# Patient Record
Sex: FEMALE | Race: WHITE | NOT HISPANIC OR LATINO
[De-identification: names, ages, dates, MRNs, and addresses within clinical notes are randomized per-mention and may not be internally consistent; named-entity substitution may affect disease eponyms.]

---

## 2018-06-21 ENCOUNTER — RX RENEWAL (OUTPATIENT)
Age: 78
End: 2018-06-21

## 2018-06-28 ENCOUNTER — RX RENEWAL (OUTPATIENT)
Age: 78
End: 2018-06-28

## 2018-07-25 ENCOUNTER — RECORD ABSTRACTING (OUTPATIENT)
Age: 78
End: 2018-07-25

## 2018-07-25 ENCOUNTER — RX RENEWAL (OUTPATIENT)
Age: 78
End: 2018-07-25

## 2018-07-26 ENCOUNTER — RX RENEWAL (OUTPATIENT)
Age: 78
End: 2018-07-26

## 2018-08-01 ENCOUNTER — APPOINTMENT (OUTPATIENT)
Dept: HEART AND VASCULAR | Facility: CLINIC | Age: 78
End: 2018-08-01
Payer: MEDICARE

## 2018-08-01 VITALS
BODY MASS INDEX: 20.38 KG/M2 | HEIGHT: 63 IN | RESPIRATION RATE: 12 BRPM | SYSTOLIC BLOOD PRESSURE: 170 MMHG | WEIGHT: 115 LBS | HEART RATE: 60 BPM | DIASTOLIC BLOOD PRESSURE: 90 MMHG

## 2018-08-01 DIAGNOSIS — Z82.3 FAMILY HISTORY OF STROKE: ICD-10-CM

## 2018-08-01 DIAGNOSIS — Z78.9 OTHER SPECIFIED HEALTH STATUS: ICD-10-CM

## 2018-08-01 DIAGNOSIS — Z83.3 FAMILY HISTORY OF DIABETES MELLITUS: ICD-10-CM

## 2018-08-01 PROCEDURE — 99214 OFFICE O/P EST MOD 30 MIN: CPT

## 2018-08-01 RX ORDER — AZITHROMYCIN 250 MG/1
250 TABLET, FILM COATED ORAL
Qty: 6 | Refills: 0 | Status: DISCONTINUED | COMMUNITY
Start: 2018-02-28

## 2018-08-01 RX ORDER — RANITIDINE 150 MG/1
150 TABLET ORAL
Qty: 20 | Refills: 0 | Status: DISCONTINUED | COMMUNITY
Start: 2018-04-02

## 2018-08-01 RX ORDER — OLOPATADINE HYDROCHLORIDE 7 MG/ML
0.7 SOLUTION OPHTHALMIC
Qty: 2 | Refills: 0 | Status: ACTIVE | COMMUNITY
Start: 2018-04-02

## 2018-08-01 RX ORDER — DULOXETINE HYDROCHLORIDE 30 MG/1
30 CAPSULE, DELAYED RELEASE PELLETS ORAL
Qty: 7 | Refills: 0 | Status: DISCONTINUED | COMMUNITY
Start: 2018-02-28

## 2018-08-01 RX ORDER — MELOXICAM 7.5 MG/1
7.5 TABLET ORAL
Qty: 30 | Refills: 0 | Status: DISCONTINUED | COMMUNITY
Start: 2018-02-16

## 2018-08-01 RX ORDER — FLUTICASONE PROPIONATE 50 UG/1
50 SPRAY, METERED NASAL
Qty: 16 | Refills: 0 | Status: DISCONTINUED | COMMUNITY
Start: 2018-02-14

## 2018-08-01 RX ORDER — DULOXETINE HYDROCHLORIDE 60 MG/1
60 CAPSULE, DELAYED RELEASE PELLETS ORAL
Qty: 30 | Refills: 0 | Status: DISCONTINUED | COMMUNITY
Start: 2018-02-28

## 2018-08-01 RX ORDER — METHYLPREDNISOLONE 4 MG/1
4 TABLET ORAL
Qty: 120 | Refills: 0 | Status: DISCONTINUED | COMMUNITY
Start: 2018-02-02

## 2018-08-01 RX ORDER — SULFAMETHOXAZOLE AND TRIMETHOPRIM 800; 160 MG/1; MG/1
800-160 TABLET ORAL
Qty: 20 | Refills: 0 | Status: DISCONTINUED | COMMUNITY
Start: 2018-03-08

## 2018-08-01 RX ORDER — PREDNISONE 20 MG/1
20 TABLET ORAL
Qty: 20 | Refills: 0 | Status: DISCONTINUED | COMMUNITY
Start: 2018-02-28

## 2018-08-01 RX ORDER — REPAGLINIDE 0.5 MG/1
0.5 TABLET ORAL
Qty: 90 | Refills: 0 | Status: DISCONTINUED | COMMUNITY
Start: 2018-06-26

## 2018-08-01 RX ORDER — CIPROFLOXACIN HYDROCHLORIDE 500 MG/1
500 TABLET, FILM COATED ORAL
Qty: 20 | Refills: 0 | Status: DISCONTINUED | COMMUNITY
Start: 2018-03-08

## 2018-08-01 RX ORDER — OLOPATADINE HYDROCHLORIDE 2 MG/ML
0.2 SOLUTION OPHTHALMIC
Qty: 2 | Refills: 0 | Status: ACTIVE | COMMUNITY
Start: 2018-06-06

## 2018-08-01 RX ORDER — GABAPENTIN 300 MG/1
300 CAPSULE ORAL
Qty: 90 | Refills: 0 | Status: DISCONTINUED | COMMUNITY
Start: 2018-05-08

## 2018-08-01 RX ORDER — ONDANSETRON 4 MG/1
4 TABLET ORAL
Qty: 30 | Refills: 0 | Status: DISCONTINUED | COMMUNITY
Start: 2018-03-02

## 2018-08-01 RX ORDER — METHYLPREDNISOLONE 4 MG/1
4 TABLET ORAL
Qty: 21 | Refills: 0 | Status: DISCONTINUED | COMMUNITY
Start: 2018-04-10

## 2018-08-01 NOTE — PHYSICAL EXAM
[No Acute Distress] : no acute distress [Well Nourished] : well nourished [Well Developed] : well developed [Normal Outer Ear/Nose] : the outer ears and nose were normal in appearance [Normal Oropharynx] : the oropharynx was normal [No JVD] : no jugular venous distention [Supple] : supple [No Lymphadenopathy] : no lymphadenopathy [No Respiratory Distress] : no respiratory distress  [Clear to Auscultation] : lungs were clear to auscultation bilaterally [No Accessory Muscle Use] : no accessory muscle use [Normal Rate] : normal rate  [Regular Rhythm] : with a regular rhythm [Normal S1, S2] : normal S1 and S2 [No Edema] : there was no peripheral edema [Soft] : abdomen soft [Non Tender] : non-tender [No HSM] : no HSM [Normal Bowel Sounds] : normal bowel sounds [No Joint Swelling] : no joint swelling [Grossly Normal Strength/Tone] : grossly normal strength/tone [Normal Gait] : normal gait [Coordination Grossly Intact] : coordination grossly intact [No Focal Deficits] : no focal deficits [de-identified] : rt CVAS tenderness

## 2018-08-01 NOTE — PLAN
[FreeTextEntry1] : 1. Back pain: Patient with right costovertebral angle pain on palpation, reports foul-smelling urine but denies dysuria. Will get a urinalysis/urine culture and advise. Patient denies any fever, chills, nausea vomiting.patient scheduled for fasting blood work tomorrow by [employer], advised to get copy for review\par 2. hypertension: Patient reports eating potato chips prior to her visit here today and reports only taking the Lotrel but not the carvedilol and is planning on taking the carvedilol when she leaves she reports that she checks her blood pressure routinely and typically numbers are between 120-140/80-90. Shortly advised against salt rich diet\par 3. Left wrist pain: Advised to see orthopedics.

## 2018-08-01 NOTE — HISTORY OF PRESENT ILLNESS
[___ Days ago] : [unfilled] days ago [Episodic] : episodic [Moderate] : moderate [At Night] : at night [Worsening] : worsening [de-identified] : l/s spine  [FreeTextEntry8] : 77-year-old female comes in complaining of bilateral lower back pain worse at night worse with taking deep breaths difficulty laying on the right side due to pain. Patient follows closely with a pain specialist and is scheduled to see him. Patient denies any fever, trauma, hematuria.

## 2018-08-02 LAB
APPEARANCE: CLEAR
BACTERIA: NEGATIVE
BILIRUBIN URINE: NEGATIVE
BLOOD URINE: NEGATIVE
COLOR: YELLOW
GLUCOSE QUALITATIVE U: NEGATIVE MG/DL
HYALINE CASTS: 2 /LPF
KETONES URINE: NEGATIVE
LEUKOCYTE ESTERASE URINE: NEGATIVE
MICROSCOPIC-UA: NORMAL
NITRITE URINE: NEGATIVE
PH URINE: 6.5
PROTEIN URINE: NEGATIVE MG/DL
RED BLOOD CELLS URINE: 1 /HPF
SPECIFIC GRAVITY URINE: 1.01
SQUAMOUS EPITHELIAL CELLS: 1 /HPF
UROBILINOGEN URINE: NEGATIVE MG/DL
WHITE BLOOD CELLS URINE: 1 /HPF

## 2018-08-07 LAB — BACTERIA UR CULT: NORMAL

## 2018-08-28 ENCOUNTER — RX RENEWAL (OUTPATIENT)
Age: 78
End: 2018-08-28

## 2018-09-06 ENCOUNTER — RX RENEWAL (OUTPATIENT)
Age: 78
End: 2018-09-06

## 2018-09-26 ENCOUNTER — APPOINTMENT (OUTPATIENT)
Dept: HEART AND VASCULAR | Facility: CLINIC | Age: 78
End: 2018-09-26
Payer: MEDICARE

## 2018-09-26 VITALS
WEIGHT: 113 LBS | BODY MASS INDEX: 20.02 KG/M2 | SYSTOLIC BLOOD PRESSURE: 150 MMHG | RESPIRATION RATE: 12 BRPM | DIASTOLIC BLOOD PRESSURE: 90 MMHG | HEIGHT: 63 IN | HEART RATE: 70 BPM

## 2018-09-26 VITALS — WEIGHT: 118 LBS | HEIGHT: 63 IN | BODY MASS INDEX: 20.91 KG/M2

## 2018-09-26 DIAGNOSIS — Z87.09 PERSONAL HISTORY OF OTHER DISEASES OF THE RESPIRATORY SYSTEM: ICD-10-CM

## 2018-09-26 DIAGNOSIS — Z87.19 PERSONAL HISTORY OF OTHER DISEASES OF THE DIGESTIVE SYSTEM: ICD-10-CM

## 2018-09-26 LAB — S PYO AG SPEC QL IA: NORMAL

## 2018-09-26 PROCEDURE — 87880 STREP A ASSAY W/OPTIC: CPT | Mod: QW

## 2018-09-26 PROCEDURE — 99213 OFFICE O/P EST LOW 20 MIN: CPT

## 2018-09-26 RX ORDER — METRONIDAZOLE 500 MG/1
500 TABLET ORAL
Qty: 28 | Refills: 0 | Status: COMPLETED | COMMUNITY
Start: 2018-09-12

## 2018-09-26 RX ORDER — CEFPODOXIME PROXETIL 200 MG/1
200 TABLET, FILM COATED ORAL
Qty: 20 | Refills: 0 | Status: COMPLETED | COMMUNITY
Start: 2018-09-12

## 2018-09-26 NOTE — HISTORY OF PRESENT ILLNESS
[FreeTextEntry8] : 77-year-old female comes in with complaints of URI, sore throat runny nose headaches pain. Patient reports recent hospitalization for diverticulitis has just completed a course of 2 antibiotics including ciprofloxacin.

## 2018-09-26 NOTE — REVIEW OF SYSTEMS
[Fatigue] : fatigue [Nasal Discharge] : nasal discharge [Sore Throat] : sore throat [Negative] : Psychiatric

## 2018-09-26 NOTE — PHYSICAL EXAM
[No Acute Distress] : no acute distress [Well Nourished] : well nourished [Well Developed] : well developed [EOMI] : extraocular movements intact [Normal Outer Ear/Nose] : the outer ears and nose were normal in appearance [No JVD] : no jugular venous distention [Supple] : supple [No Respiratory Distress] : no respiratory distress  [Clear to Auscultation] : lungs were clear to auscultation bilaterally [No Accessory Muscle Use] : no accessory muscle use [Normal Rate] : normal rate  [Regular Rhythm] : with a regular rhythm [Normal S1, S2] : normal S1 and S2 [Soft] : abdomen soft [Non Tender] : non-tender [No HSM] : no HSM [Normal Bowel Sounds] : normal bowel sounds [No Joint Swelling] : no joint swelling [Grossly Normal Strength/Tone] : grossly normal strength/tone [Normal Gait] : normal gait [de-identified] : erythematous no exudate

## 2018-09-28 ENCOUNTER — RX RENEWAL (OUTPATIENT)
Age: 78
End: 2018-09-28

## 2018-10-03 LAB — BACTERIA THROAT CULT: NORMAL

## 2018-10-25 ENCOUNTER — APPOINTMENT (OUTPATIENT)
Dept: HEART AND VASCULAR | Facility: CLINIC | Age: 78
End: 2018-10-25

## 2018-10-30 ENCOUNTER — RX RENEWAL (OUTPATIENT)
Age: 78
End: 2018-10-30

## 2018-12-03 ENCOUNTER — RX RENEWAL (OUTPATIENT)
Age: 78
End: 2018-12-03

## 2018-12-31 ENCOUNTER — RX RENEWAL (OUTPATIENT)
Age: 78
End: 2018-12-31

## 2019-01-28 ENCOUNTER — APPOINTMENT (OUTPATIENT)
Dept: HEART AND VASCULAR | Facility: CLINIC | Age: 79
End: 2019-01-28
Payer: MEDICARE

## 2019-01-28 VITALS — DIASTOLIC BLOOD PRESSURE: 70 MMHG | SYSTOLIC BLOOD PRESSURE: 140 MMHG

## 2019-01-28 VITALS — HEIGHT: 63 IN | WEIGHT: 119 LBS | BODY MASS INDEX: 21.09 KG/M2

## 2019-01-28 PROCEDURE — 99213 OFFICE O/P EST LOW 20 MIN: CPT

## 2019-01-28 NOTE — HISTORY OF PRESENT ILLNESS
[FreeTextEntry8] : dysuria\par The patient presents with 3-4 days of burning pain while urinating with no fever or no loss of appetite and no diarrhea or vomiting.

## 2019-01-30 LAB
APPEARANCE: CLEAR
BACTERIA: NEGATIVE
BILIRUBIN URINE: NEGATIVE
BLOOD URINE: NEGATIVE
COLOR: YELLOW
GLUCOSE QUALITATIVE U: NEGATIVE MG/DL
HYALINE CASTS: 0 /LPF
KETONES URINE: NEGATIVE
LEUKOCYTE ESTERASE URINE: ABNORMAL
MICROSCOPIC-UA: NORMAL
NITRITE URINE: NEGATIVE
PH URINE: 6
PROTEIN URINE: NEGATIVE MG/DL
RED BLOOD CELLS URINE: 2 /HPF
SPECIFIC GRAVITY URINE: 1.01
SQUAMOUS EPITHELIAL CELLS: 1 /HPF
UROBILINOGEN URINE: NEGATIVE MG/DL
WHITE BLOOD CELLS URINE: 64 /HPF

## 2019-01-31 LAB — BACTERIA UR CULT: ABNORMAL

## 2019-02-25 ENCOUNTER — APPOINTMENT (OUTPATIENT)
Dept: HEART AND VASCULAR | Facility: CLINIC | Age: 79
End: 2019-02-25
Payer: MEDICARE

## 2019-02-25 VITALS
SYSTOLIC BLOOD PRESSURE: 170 MMHG | BODY MASS INDEX: 21.62 KG/M2 | RESPIRATION RATE: 12 BRPM | WEIGHT: 122 LBS | HEART RATE: 74 BPM | DIASTOLIC BLOOD PRESSURE: 80 MMHG | HEIGHT: 63 IN

## 2019-02-25 PROCEDURE — 99213 OFFICE O/P EST LOW 20 MIN: CPT

## 2019-02-25 RX ORDER — CIPROFLOXACIN HYDROCHLORIDE 500 MG/1
500 TABLET, FILM COATED ORAL TWICE DAILY
Qty: 14 | Refills: 0 | Status: DISCONTINUED | COMMUNITY
Start: 2019-01-28 | End: 2019-02-25

## 2019-02-25 NOTE — PLAN
[FreeTextEntry1] : 1. Sinusitis: Will prescribe the Zithromax as patient is pen allergic advice to take with food and to call or return of symptoms do not improve or worsen. advised plenty of warm fluids\par 2. Hypertension: Patient does not feel well will will reassess at the next appointment.

## 2019-02-25 NOTE — HISTORY OF PRESENT ILLNESS
[FreeTextEntry8] : 78-year-old female comes in for evaluation of sinusitis. Patient reports 4 days of pressure in her head behind her eyes runny nose productive cough and sore throat no fever.

## 2019-02-25 NOTE — REVIEW OF SYSTEMS
[Cough] : cough [Negative] : Neurological [FreeTextEntry2] : see history of present illness [FreeTextEntry4] : see history of present illness

## 2019-02-28 ENCOUNTER — RX RENEWAL (OUTPATIENT)
Age: 79
End: 2019-02-28

## 2019-04-09 ENCOUNTER — RX RENEWAL (OUTPATIENT)
Age: 79
End: 2019-04-09

## 2019-04-09 ENCOUNTER — APPOINTMENT (OUTPATIENT)
Dept: HEART AND VASCULAR | Facility: CLINIC | Age: 79
End: 2019-04-09
Payer: MEDICARE

## 2019-04-09 VITALS — HEIGHT: 63 IN | WEIGHT: 119 LBS | BODY MASS INDEX: 21.09 KG/M2

## 2019-04-09 VITALS — SYSTOLIC BLOOD PRESSURE: 130 MMHG | DIASTOLIC BLOOD PRESSURE: 70 MMHG

## 2019-04-09 PROCEDURE — 99214 OFFICE O/P EST MOD 30 MIN: CPT

## 2019-04-09 RX ORDER — AZITHROMYCIN 250 MG/1
250 TABLET, FILM COATED ORAL
Qty: 1 | Refills: 0 | Status: DISCONTINUED | COMMUNITY
Start: 2019-02-25 | End: 2019-04-09

## 2019-04-09 NOTE — PHYSICAL EXAM
[No Acute Distress] : no acute distress [Normal Sclera/Conjunctiva] : normal sclera/conjunctiva [No Lymphadenopathy] : no lymphadenopathy [No Respiratory Distress] : no respiratory distress  [Clear to Auscultation] : lungs were clear to auscultation bilaterally [Normal Rate] : normal rate  [Soft] : abdomen soft [de-identified] : mild coryza

## 2019-04-09 NOTE — HISTORY OF PRESENT ILLNESS
[FreeTextEntry8] : Is a 78-year-old female who presents with 3-4 days of sore throat cough rhinitis body aches with no fevers.

## 2019-04-09 NOTE — REVIEW OF SYSTEMS
[Chills] : chills [Fatigue] : fatigue [Pain] : pain [Earache] : earache [Nasal Discharge] : nasal discharge [Shortness Of Breath] : shortness of breath

## 2019-04-12 ENCOUNTER — MOBILE ON CALL (OUTPATIENT)
Age: 79
End: 2019-04-12

## 2019-04-12 LAB — BACTERIA THROAT CULT: NORMAL

## 2019-05-02 ENCOUNTER — RX RENEWAL (OUTPATIENT)
Age: 79
End: 2019-05-02

## 2019-05-09 ENCOUNTER — APPOINTMENT (OUTPATIENT)
Dept: HEART AND VASCULAR | Facility: CLINIC | Age: 79
End: 2019-05-09
Payer: MEDICARE

## 2019-05-09 VITALS — SYSTOLIC BLOOD PRESSURE: 130 MMHG | DIASTOLIC BLOOD PRESSURE: 70 MMHG

## 2019-05-09 PROCEDURE — 36415 COLL VENOUS BLD VENIPUNCTURE: CPT

## 2019-05-10 LAB
25(OH)D3 SERPL-MCNC: 29.6 NG/ML
ALBUMIN SERPL ELPH-MCNC: 4.7 G/DL
ALP BLD-CCNC: 59 U/L
ALT SERPL-CCNC: 13 U/L
ANION GAP SERPL CALC-SCNC: 13 MMOL/L
AST SERPL-CCNC: 21 U/L
BASOPHILS # BLD AUTO: 0.02 K/UL
BASOPHILS NFR BLD AUTO: 0.4 %
BILIRUB SERPL-MCNC: 0.6 MG/DL
BUN SERPL-MCNC: 18 MG/DL
CALCIUM SERPL-MCNC: 10.1 MG/DL
CHLORIDE SERPL-SCNC: 102 MMOL/L
CHOLEST SERPL-MCNC: 224 MG/DL
CHOLEST/HDLC SERPL: 2.6 RATIO
CO2 SERPL-SCNC: 27 MMOL/L
CREAT SERPL-MCNC: 0.89 MG/DL
EOSINOPHIL # BLD AUTO: 0.1 K/UL
EOSINOPHIL NFR BLD AUTO: 2 %
ESTIMATED AVERAGE GLUCOSE: 151 MG/DL
GLUCOSE SERPL-MCNC: 136 MG/DL
HBA1C MFR BLD HPLC: 6.9 %
HCT VFR BLD CALC: 44.7 %
HDLC SERPL-MCNC: 87 MG/DL
HGB BLD-MCNC: 13.4 G/DL
IMM GRANULOCYTES NFR BLD AUTO: 0.2 %
LDLC SERPL CALC-MCNC: 120 MG/DL
LYMPHOCYTES # BLD AUTO: 2.78 K/UL
LYMPHOCYTES NFR BLD AUTO: 55.9 %
MAN DIFF?: NORMAL
MCHC RBC-ENTMCNC: 30 GM/DL
MCHC RBC-ENTMCNC: 30.2 PG
MCV RBC AUTO: 100.7 FL
MONOCYTES # BLD AUTO: 0.36 K/UL
MONOCYTES NFR BLD AUTO: 7.2 %
NEUTROPHILS # BLD AUTO: 1.7 K/UL
NEUTROPHILS NFR BLD AUTO: 34.3 %
PLATELET # BLD AUTO: 184 K/UL
POTASSIUM SERPL-SCNC: 4.5 MMOL/L
PROT SERPL-MCNC: 6.8 G/DL
RBC # BLD: 4.44 M/UL
RBC # FLD: 13.8 %
SODIUM SERPL-SCNC: 142 MMOL/L
T3 SERPL-MCNC: 96 NG/DL
T3FREE SERPL-MCNC: 2.8 PG/ML
T4 FREE SERPL-MCNC: 1.2 NG/DL
T4 SERPL-MCNC: 6.5 UG/DL
TRIGL SERPL-MCNC: 85 MG/DL
TSH SERPL-ACNC: 2.95 UIU/ML
WBC # FLD AUTO: 4.97 K/UL

## 2019-05-14 ENCOUNTER — APPOINTMENT (OUTPATIENT)
Dept: HEART AND VASCULAR | Facility: CLINIC | Age: 79
End: 2019-05-14
Payer: MEDICARE

## 2019-05-14 VITALS — SYSTOLIC BLOOD PRESSURE: 130 MMHG | DIASTOLIC BLOOD PRESSURE: 70 MMHG

## 2019-05-14 VITALS — HEIGHT: 63 IN | BODY MASS INDEX: 21.26 KG/M2 | WEIGHT: 120 LBS

## 2019-05-14 PROCEDURE — 99214 OFFICE O/P EST MOD 30 MIN: CPT

## 2019-05-14 PROCEDURE — 93306 TTE W/DOPPLER COMPLETE: CPT

## 2019-05-14 PROCEDURE — 93000 ELECTROCARDIOGRAM COMPLETE: CPT

## 2019-05-14 NOTE — ASSESSMENT
[FreeTextEntry1] : MR on echo unchanged\par meds reviewed \par Has hgaic 6.9 will see endo next week \par continue diet control for now \par diet discussed

## 2019-05-14 NOTE — PHYSICAL EXAM
[General Appearance - Well Developed] : well developed [Normal Appearance] : normal appearance [General Appearance - Well Nourished] : well nourished [Well Groomed] : well groomed [No Deformities] : no deformities [Normal Conjunctiva] : the conjunctiva exhibited no abnormalities [General Appearance - In No Acute Distress] : no acute distress [No Oral Pallor] : no oral pallor [Eyelids - No Xanthelasma] : the eyelids demonstrated no xanthelasmas [Normal Oral Mucosa] : normal oral mucosa [Normal Jugular Venous A Waves Present] : normal jugular venous A waves present [No Oral Cyanosis] : no oral cyanosis [Normal Jugular Venous V Waves Present] : normal jugular venous V waves present [No Jugular Venous Gould A Waves] : no jugular venous gould A waves [Exaggerated Use Of Accessory Muscles For Inspiration] : no accessory muscle use [Respiration, Rhythm And Depth] : normal respiratory rhythm and effort [Rhythm Regular] : regular [Auscultation Breath Sounds / Voice Sounds] : lungs were clear to auscultation bilaterally [Normal S1] : normal S1 [Normal S2] : normal S2 [II] : a grade 2 [Abdomen Tenderness] : non-tender [Abdomen Soft] : soft [Abdomen Mass (___ Cm)] : no abdominal mass palpated [Abnormal Walk] : normal gait [Nail Clubbing] : no clubbing of the fingernails [Gait - Sufficient For Exercise Testing] : the gait was sufficient for exercise testing [Cyanosis, Localized] : no localized cyanosis [Skin Color & Pigmentation] : normal skin color and pigmentation [Petechial Hemorrhages (___cm)] : no petechial hemorrhages [No Venous Stasis] : no venous stasis [] : no rash [No Skin Ulcers] : no skin ulcer [Skin Lesions] : no skin lesions [No Xanthoma] : no  xanthoma was observed [Oriented To Time, Place, And Person] : oriented to person, place, and time [Affect] : the affect was normal [Mood] : the mood was normal [No Anxiety] : not feeling anxious [No Acute Distress] : no acute distress [Normal Sclera/Conjunctiva] : normal sclera/conjunctiva [No Lymphadenopathy] : no lymphadenopathy [No Respiratory Distress] : no respiratory distress  [Clear to Auscultation] : lungs were clear to auscultation bilaterally [Soft] : abdomen soft [Normal Rate] : normal rate  [de-identified] : mild coryza

## 2019-05-14 NOTE — REVIEW OF SYSTEMS
[Fatigue] : fatigue [Chills] : chills [Earache] : earache [Nasal Discharge] : nasal discharge [Palpitations] : palpitations [Chest Pain] : chest pain [Shortness Of Breath] : shortness of breath [Back Pain] : back pain [Muscle Pain] : muscle pain [Joint Stiffness] : joint stiffness [Joint Pain] : joint pain [Muscle Cramps] : muscle cramps [Negative] : Constitutional [Pain] : no pain

## 2019-05-14 NOTE — HISTORY OF PRESENT ILLNESS
[FreeTextEntry1] : Patient is a 78-year-old female with baseline hypertension and diffuse myalgias of unknown etiology who presents for routine medical cardiac followup\par Patient has intermittent headaches and dizziness with no symptoms of syncope her effort tolerance is somewhat limited due to diffuse orthopedic limitations.\par followed for AODM OFF MEDS RECENT HGAIC 6.9

## 2019-06-12 ENCOUNTER — RX RENEWAL (OUTPATIENT)
Age: 79
End: 2019-06-12

## 2019-07-08 ENCOUNTER — RX RENEWAL (OUTPATIENT)
Age: 79
End: 2019-07-08

## 2019-07-11 ENCOUNTER — APPOINTMENT (OUTPATIENT)
Dept: HEART AND VASCULAR | Facility: CLINIC | Age: 79
End: 2019-07-11

## 2019-07-15 ENCOUNTER — APPOINTMENT (OUTPATIENT)
Dept: HEART AND VASCULAR | Facility: CLINIC | Age: 79
End: 2019-07-15

## 2019-07-31 ENCOUNTER — RX RENEWAL (OUTPATIENT)
Age: 79
End: 2019-07-31

## 2019-08-01 ENCOUNTER — RX RENEWAL (OUTPATIENT)
Age: 79
End: 2019-08-01

## 2019-08-22 ENCOUNTER — APPOINTMENT (OUTPATIENT)
Dept: HEART AND VASCULAR | Facility: CLINIC | Age: 79
End: 2019-08-22
Payer: MEDICARE

## 2019-08-22 PROCEDURE — 99214 OFFICE O/P EST MOD 30 MIN: CPT

## 2019-08-22 NOTE — ASSESSMENT
[FreeTextEntry1] : \par meds reviewed \par Has hgaic 6.9 will see endo next week \par continue diet control for now \par diet discussed \par no signs of DVT \par tonic water 4oz at bedtime

## 2019-08-22 NOTE — HISTORY OF PRESENT ILLNESS
[FreeTextEntry1] : Patient is a 78-year-old female with baseline hypertension and diffuse myalgias of unknown etiology who presents for routine medical cardiac followup\par Patient has intermittent headaches and dizziness with no symptoms of syncope her effort tolerance is somewhat limited due to diffuse orthopedic limitations.\par followed for AODM OFF MEDS RECENT HGAIC 6.9\par Has longstanding variose veins \par Has had recent nocturnal leg cramping which resolves spontaneously\par mo leg pain or swelling \par

## 2019-08-22 NOTE — PHYSICAL EXAM
[General Appearance - Well Developed] : well developed [Well Groomed] : well groomed [Normal Appearance] : normal appearance [General Appearance - Well Nourished] : well nourished [No Deformities] : no deformities [Normal Conjunctiva] : the conjunctiva exhibited no abnormalities [General Appearance - In No Acute Distress] : no acute distress [Normal Oral Mucosa] : normal oral mucosa [Eyelids - No Xanthelasma] : the eyelids demonstrated no xanthelasmas [No Oral Pallor] : no oral pallor [No Oral Cyanosis] : no oral cyanosis [Normal Jugular Venous A Waves Present] : normal jugular venous A waves present [Normal Jugular Venous V Waves Present] : normal jugular venous V waves present [No Jugular Venous Gould A Waves] : no jugular venous gould A waves [Respiration, Rhythm And Depth] : normal respiratory rhythm and effort [Exaggerated Use Of Accessory Muscles For Inspiration] : no accessory muscle use [Auscultation Breath Sounds / Voice Sounds] : lungs were clear to auscultation bilaterally [Abdomen Soft] : soft [Abdomen Tenderness] : non-tender [Abdomen Mass (___ Cm)] : no abdominal mass palpated [Abnormal Walk] : normal gait [Gait - Sufficient For Exercise Testing] : the gait was sufficient for exercise testing [Nail Clubbing] : no clubbing of the fingernails [Petechial Hemorrhages (___cm)] : no petechial hemorrhages [Cyanosis, Localized] : no localized cyanosis [Skin Color & Pigmentation] : normal skin color and pigmentation [] : no rash [No Venous Stasis] : no venous stasis [Skin Lesions] : no skin lesions [No Skin Ulcers] : no skin ulcer [No Xanthoma] : no  xanthoma was observed [Oriented To Time, Place, And Person] : oriented to person, place, and time [Affect] : the affect was normal [Mood] : the mood was normal [No Anxiety] : not feeling anxious [Normal S1] : normal S1 [Rhythm Regular] : regular [Normal S2] : normal S2 [II] : a grade 2 [No Acute Distress] : no acute distress [Normal Sclera/Conjunctiva] : normal sclera/conjunctiva [No Lymphadenopathy] : no lymphadenopathy [No Respiratory Distress] : no respiratory distress  [Clear to Auscultation] : lungs were clear to auscultation bilaterally [Normal Rate] : normal rate  [Soft] : abdomen soft [de-identified] : mild coryza

## 2019-08-22 NOTE — REVIEW OF SYSTEMS
[Muscle Cramps] : muscle cramps [Chills] : chills [Fatigue] : fatigue [Earache] : earache [Chest Pain] : chest pain [Nasal Discharge] : nasal discharge [Palpitations] : palpitations [Shortness Of Breath] : shortness of breath [Joint Stiffness] : joint stiffness [Joint Pain] : joint pain [Back Pain] : back pain [Muscle Pain] : muscle pain [Negative] : Heme/Lymph [Pain] : no pain

## 2019-08-25 ENCOUNTER — MOBILE ON CALL (OUTPATIENT)
Age: 79
End: 2019-08-25

## 2019-08-26 LAB
ALBUMIN SERPL ELPH-MCNC: 4.8 G/DL
ALP BLD-CCNC: 45 U/L
ALT SERPL-CCNC: 15 U/L
ANION GAP SERPL CALC-SCNC: 12 MMOL/L
AST SERPL-CCNC: 19 U/L
BASOPHILS # BLD AUTO: 0.02 K/UL
BASOPHILS NFR BLD AUTO: 0.5 %
BILIRUB SERPL-MCNC: 0.6 MG/DL
BUN SERPL-MCNC: 16 MG/DL
CALCIUM SERPL-MCNC: 10.1 MG/DL
CHLORIDE SERPL-SCNC: 103 MMOL/L
CHOLEST SERPL-MCNC: 223 MG/DL
CHOLEST/HDLC SERPL: 2.3 RATIO
CO2 SERPL-SCNC: 26 MMOL/L
CREAT SERPL-MCNC: 0.89 MG/DL
EOSINOPHIL # BLD AUTO: 0.05 K/UL
EOSINOPHIL NFR BLD AUTO: 1.2 %
ESTIMATED AVERAGE GLUCOSE: 146 MG/DL
GLUCOSE SERPL-MCNC: 89 MG/DL
HBA1C MFR BLD HPLC: 6.7 %
HCT VFR BLD CALC: 47.5 %
HDLC SERPL-MCNC: 97 MG/DL
HGB BLD-MCNC: 14.3 G/DL
IMM GRANULOCYTES NFR BLD AUTO: 0.2 %
LDLC SERPL CALC-MCNC: 108 MG/DL
LYMPHOCYTES # BLD AUTO: 1.66 K/UL
LYMPHOCYTES NFR BLD AUTO: 39.2 %
MAGNESIUM SERPL-MCNC: 2.4 MG/DL
MAN DIFF?: NORMAL
MCHC RBC-ENTMCNC: 29.9 PG
MCHC RBC-ENTMCNC: 30.1 GM/DL
MCV RBC AUTO: 99.4 FL
MONOCYTES # BLD AUTO: 0.37 K/UL
MONOCYTES NFR BLD AUTO: 8.7 %
NEUTROPHILS # BLD AUTO: 2.12 K/UL
NEUTROPHILS NFR BLD AUTO: 50.2 %
PLATELET # BLD AUTO: 179 K/UL
POTASSIUM SERPL-SCNC: 4.1 MMOL/L
PROT SERPL-MCNC: 7.1 G/DL
RBC # BLD: 4.78 M/UL
RBC # FLD: 13.7 %
SODIUM SERPL-SCNC: 141 MMOL/L
T3 SERPL-MCNC: 85 NG/DL
T3FREE SERPL-MCNC: 2.37 PG/ML
T4 FREE SERPL-MCNC: 1.2 NG/DL
T4 SERPL-MCNC: 7.5 UG/DL
TRIGL SERPL-MCNC: 89 MG/DL
TSH SERPL-ACNC: 1.73 UIU/ML
WBC # FLD AUTO: 4.23 K/UL

## 2019-08-30 ENCOUNTER — MOBILE ON CALL (OUTPATIENT)
Age: 79
End: 2019-08-30

## 2019-09-04 ENCOUNTER — RX RENEWAL (OUTPATIENT)
Age: 79
End: 2019-09-04

## 2019-09-06 ENCOUNTER — APPOINTMENT (OUTPATIENT)
Dept: HEART AND VASCULAR | Facility: CLINIC | Age: 79
End: 2019-09-06
Payer: MEDICARE

## 2019-09-06 VITALS — WEIGHT: 115 LBS | HEIGHT: 63 IN | BODY MASS INDEX: 20.38 KG/M2

## 2019-09-06 VITALS — DIASTOLIC BLOOD PRESSURE: 80 MMHG | SYSTOLIC BLOOD PRESSURE: 155 MMHG

## 2019-09-06 PROCEDURE — 99214 OFFICE O/P EST MOD 30 MIN: CPT

## 2019-09-06 NOTE — HISTORY OF PRESENT ILLNESS
[FreeTextEntry1] : seen in urgent care for spike in Bp to 190/100 with flushed feeliing \par seen by ent for congestion Ct benign \par

## 2019-09-06 NOTE — PHYSICAL EXAM
[General Appearance - Well Developed] : well developed [Normal Appearance] : normal appearance [Well Groomed] : well groomed [General Appearance - Well Nourished] : well nourished [No Deformities] : no deformities [General Appearance - In No Acute Distress] : no acute distress [Eyelids - No Xanthelasma] : the eyelids demonstrated no xanthelasmas [Normal Conjunctiva] : the conjunctiva exhibited no abnormalities [Normal Oral Mucosa] : normal oral mucosa [No Oral Pallor] : no oral pallor [No Oral Cyanosis] : no oral cyanosis [Respiration, Rhythm And Depth] : normal respiratory rhythm and effort [Exaggerated Use Of Accessory Muscles For Inspiration] : no accessory muscle use [Auscultation Breath Sounds / Voice Sounds] : lungs were clear to auscultation bilaterally [Heart Rate And Rhythm] : heart rate and rhythm were normal [Heart Sounds] : normal S1 and S2 [Murmurs] : no murmurs present [Abdomen Tenderness] : non-tender [Abdomen Soft] : soft [Abdomen Mass (___ Cm)] : no abdominal mass palpated [] : no hepato-splenomegaly [Abnormal Walk] : normal gait [Gait - Sufficient For Exercise Testing] : the gait was sufficient for exercise testing

## 2019-09-09 ENCOUNTER — RX RENEWAL (OUTPATIENT)
Age: 79
End: 2019-09-09

## 2019-10-10 ENCOUNTER — APPOINTMENT (OUTPATIENT)
Dept: HEART AND VASCULAR | Facility: CLINIC | Age: 79
End: 2019-10-10
Payer: MEDICARE

## 2019-10-10 VITALS
DIASTOLIC BLOOD PRESSURE: 82 MMHG | HEART RATE: 59 BPM | SYSTOLIC BLOOD PRESSURE: 155 MMHG | WEIGHT: 114 LBS | BODY MASS INDEX: 20.2 KG/M2 | HEIGHT: 63 IN

## 2019-10-10 PROCEDURE — 99213 OFFICE O/P EST LOW 20 MIN: CPT

## 2019-10-11 LAB — ERYTHROCYTE [SEDIMENTATION RATE] IN BLOOD BY WESTERGREN METHOD: 12 MM/HR

## 2019-10-11 NOTE — PHYSICAL EXAM
[No Acute Distress] : no acute distress [Well Nourished] : well nourished [Well-Appearing] : well-appearing [Well Developed] : well developed [PERRL] : pupils equal round and reactive to light [EOMI] : extraocular movements intact [Normal Sclera/Conjunctiva] : normal sclera/conjunctiva [Normal Oropharynx] : the oropharynx was normal [Normal Outer Ear/Nose] : the outer ears and nose were normal in appearance [No JVD] : no jugular venous distention [Supple] : supple [No Lymphadenopathy] : no lymphadenopathy [Thyroid Normal, No Nodules] : the thyroid was normal and there were no nodules present [No Respiratory Distress] : no respiratory distress  [Clear to Auscultation] : lungs were clear to auscultation bilaterally [No Accessory Muscle Use] : no accessory muscle use [Normal Rate] : normal rate  [Regular Rhythm] : with a regular rhythm [Normal S1, S2] : normal S1 and S2 [No Murmur] : no murmur heard [No Carotid Bruits] : no carotid bruits [No Abdominal Bruit] : a ~M bruit was not heard ~T in the abdomen [Pedal Pulses Present] : the pedal pulses are present [No Varicosities] : no varicosities [No Palpable Aorta] : no palpable aorta [No Edema] : there was no peripheral edema [Soft] : abdomen soft [No Extremity Clubbing/Cyanosis] : no extremity clubbing/cyanosis [Non-distended] : non-distended [Non Tender] : non-tender [No Masses] : no abdominal mass palpated [Normal Bowel Sounds] : normal bowel sounds [No HSM] : no HSM [No CVA Tenderness] : no CVA  tenderness [Normal Anterior Cervical Nodes] : no anterior cervical lymphadenopathy [Normal Posterior Cervical Nodes] : no posterior cervical lymphadenopathy [No Spinal Tenderness] : no spinal tenderness [No Joint Swelling] : no joint swelling [Grossly Normal Strength/Tone] : grossly normal strength/tone [No Rash] : no rash [Coordination Grossly Intact] : coordination grossly intact [No Focal Deficits] : no focal deficits [Normal Gait] : normal gait [Normal Affect] : the affect was normal [Deep Tendon Reflexes (DTR)] : deep tendon reflexes were 2+ and symmetric [Normal Insight/Judgement] : insight and judgment were intact [de-identified] : eryhema and excoration in right upper gum line

## 2019-10-11 NOTE — HISTORY OF PRESENT ILLNESS
[FreeTextEntry8] : Left gum line pain \par seen by dentist for pain along left uper gum line with radiation to left ear and sinus \par no rhinnitis

## 2019-10-16 ENCOUNTER — APPOINTMENT (OUTPATIENT)
Dept: HEART AND VASCULAR | Facility: CLINIC | Age: 79
End: 2019-10-16

## 2019-10-18 ENCOUNTER — RX RENEWAL (OUTPATIENT)
Age: 79
End: 2019-10-18

## 2019-10-28 ENCOUNTER — APPOINTMENT (OUTPATIENT)
Dept: HEART AND VASCULAR | Facility: CLINIC | Age: 79
End: 2019-10-28

## 2019-11-05 ENCOUNTER — APPOINTMENT (OUTPATIENT)
Dept: HEART AND VASCULAR | Facility: CLINIC | Age: 79
End: 2019-11-05

## 2019-11-05 ENCOUNTER — RX RENEWAL (OUTPATIENT)
Age: 79
End: 2019-11-05

## 2019-11-07 ENCOUNTER — APPOINTMENT (OUTPATIENT)
Dept: HEART AND VASCULAR | Facility: CLINIC | Age: 79
End: 2019-11-07
Payer: MEDICARE

## 2019-11-07 VITALS
WEIGHT: 117 LBS | HEIGHT: 63 IN | BODY MASS INDEX: 20.73 KG/M2 | SYSTOLIC BLOOD PRESSURE: 135 MMHG | HEART RATE: 69 BPM | DIASTOLIC BLOOD PRESSURE: 75 MMHG

## 2019-11-07 PROCEDURE — 93880 EXTRACRANIAL BILAT STUDY: CPT

## 2019-11-07 PROCEDURE — 99214 OFFICE O/P EST MOD 30 MIN: CPT

## 2019-11-07 NOTE — PHYSICAL EXAM
[General Appearance - Well Developed] : well developed [Normal Appearance] : normal appearance [Well Groomed] : well groomed [General Appearance - Well Nourished] : well nourished [No Deformities] : no deformities [General Appearance - In No Acute Distress] : no acute distress [Normal Conjunctiva] : the conjunctiva exhibited no abnormalities [Eyelids - No Xanthelasma] : the eyelids demonstrated no xanthelasmas [No Oral Pallor] : no oral pallor [Normal Oral Mucosa] : normal oral mucosa [No Oral Cyanosis] : no oral cyanosis [] : no respiratory distress [Auscultation Breath Sounds / Voice Sounds] : lungs were clear to auscultation bilaterally [Exaggerated Use Of Accessory Muscles For Inspiration] : no accessory muscle use [Respiration, Rhythm And Depth] : normal respiratory rhythm and effort [Rhythm Regular] : regular [Normal S1] : normal S1 [Normal S2] : normal S2 [Left Carotid Bruit] : left carotid bruit heard [Right Carotid Bruit] : no bruit heard over the right carotid [FreeTextEntry1] : localized left rib tenderness

## 2019-11-07 NOTE — HISTORY OF PRESENT ILLNESS
[FreeTextEntry1] : 1-2 week of left flank / rib pain worse w cough has mod cough at nite related to reflux\par was told son and sister have sig carotid disease and wants evaluation

## 2019-11-08 LAB
APPEARANCE: CLEAR
BILIRUBIN URINE: NEGATIVE
BLOOD URINE: NEGATIVE
COLOR: NORMAL
GLUCOSE QUALITATIVE U: NEGATIVE
KETONES URINE: NEGATIVE
LEUKOCYTE ESTERASE URINE: NEGATIVE
NITRITE URINE: NEGATIVE
PH URINE: 6
PROTEIN URINE: NEGATIVE
SPECIFIC GRAVITY URINE: 1.01
UROBILINOGEN URINE: NORMAL

## 2019-12-03 ENCOUNTER — RX RENEWAL (OUTPATIENT)
Age: 79
End: 2019-12-03

## 2019-12-09 ENCOUNTER — RX RENEWAL (OUTPATIENT)
Age: 79
End: 2019-12-09

## 2019-12-14 ENCOUNTER — MOBILE ON CALL (OUTPATIENT)
Age: 79
End: 2019-12-14

## 2019-12-17 ENCOUNTER — RESULT REVIEW (OUTPATIENT)
Age: 79
End: 2019-12-17

## 2019-12-19 ENCOUNTER — APPOINTMENT (OUTPATIENT)
Dept: HEART AND VASCULAR | Facility: CLINIC | Age: 79
End: 2019-12-19
Payer: MEDICARE

## 2019-12-19 VITALS — SYSTOLIC BLOOD PRESSURE: 130 MMHG | DIASTOLIC BLOOD PRESSURE: 70 MMHG

## 2019-12-19 PROCEDURE — 36415 COLL VENOUS BLD VENIPUNCTURE: CPT

## 2019-12-20 LAB
CRP SERPL-MCNC: 0.12 MG/DL
ERYTHROCYTE [SEDIMENTATION RATE] IN BLOOD BY WESTERGREN METHOD: 7 MM/HR
RHEUMATOID FACT SER QL: <10 IU/ML

## 2019-12-29 LAB
M TB IFN-G BLD-IMP: POSITIVE
QUANTIFERON TB PLUS MITOGEN MINUS NIL: 8.04 IU/ML
QUANTIFERON TB PLUS NIL: 0.39 IU/ML
QUANTIFERON TB PLUS TB1 MINUS NIL: 2.11 IU/ML
QUANTIFERON TB PLUS TB2 MINUS NIL: 2.1 IU/ML

## 2020-01-06 ENCOUNTER — RX RENEWAL (OUTPATIENT)
Age: 80
End: 2020-01-06

## 2020-01-28 ENCOUNTER — RX RENEWAL (OUTPATIENT)
Age: 80
End: 2020-01-28

## 2020-02-25 ENCOUNTER — RX RENEWAL (OUTPATIENT)
Age: 80
End: 2020-02-25

## 2020-02-27 ENCOUNTER — APPOINTMENT (OUTPATIENT)
Dept: HEART AND VASCULAR | Facility: CLINIC | Age: 80
End: 2020-02-27

## 2020-03-03 ENCOUNTER — RX RENEWAL (OUTPATIENT)
Age: 80
End: 2020-03-03

## 2020-03-04 ENCOUNTER — APPOINTMENT (OUTPATIENT)
Dept: HEART AND VASCULAR | Facility: CLINIC | Age: 80
End: 2020-03-04

## 2020-03-09 ENCOUNTER — APPOINTMENT (OUTPATIENT)
Dept: HEART AND VASCULAR | Facility: CLINIC | Age: 80
End: 2020-03-09

## 2020-03-16 ENCOUNTER — APPOINTMENT (OUTPATIENT)
Dept: HEART AND VASCULAR | Facility: CLINIC | Age: 80
End: 2020-03-16

## 2020-06-17 ENCOUNTER — RX RENEWAL (OUTPATIENT)
Age: 80
End: 2020-06-17

## 2020-06-22 ENCOUNTER — APPOINTMENT (OUTPATIENT)
Dept: HEART AND VASCULAR | Facility: CLINIC | Age: 80
End: 2020-06-22

## 2020-07-23 ENCOUNTER — RX RENEWAL (OUTPATIENT)
Age: 80
End: 2020-07-23

## 2020-07-24 ENCOUNTER — APPOINTMENT (OUTPATIENT)
Dept: HEART AND VASCULAR | Facility: CLINIC | Age: 80
End: 2020-07-24
Payer: MEDICARE

## 2020-07-24 ENCOUNTER — NON-APPOINTMENT (OUTPATIENT)
Age: 80
End: 2020-07-24

## 2020-07-24 ENCOUNTER — LABORATORY RESULT (OUTPATIENT)
Age: 80
End: 2020-07-24

## 2020-07-24 VITALS
SYSTOLIC BLOOD PRESSURE: 160 MMHG | BODY MASS INDEX: 19.84 KG/M2 | DIASTOLIC BLOOD PRESSURE: 100 MMHG | HEIGHT: 63 IN | WEIGHT: 112 LBS

## 2020-07-24 DIAGNOSIS — Z20.1 CONTACT WITH AND (SUSPECTED) EXPOSURE TO TUBERCULOSIS: ICD-10-CM

## 2020-07-24 PROCEDURE — 99214 OFFICE O/P EST MOD 30 MIN: CPT

## 2020-07-24 PROCEDURE — 36415 COLL VENOUS BLD VENIPUNCTURE: CPT

## 2020-07-24 PROCEDURE — 93000 ELECTROCARDIOGRAM COMPLETE: CPT

## 2020-07-24 PROCEDURE — 93306 TTE W/DOPPLER COMPLETE: CPT

## 2020-07-24 RX ORDER — AMLODIPINE BESYLATE AND BENAZEPRIL HYDROCHLORIDE 5; 20 MG/1; MG/1
5-20 CAPSULE ORAL
Qty: 90 | Refills: 4 | Status: DISCONTINUED | COMMUNITY
Start: 2018-07-25 | End: 2020-07-24

## 2020-07-24 RX ORDER — AMLODIPINE BESYLATE AND BENAZEPRIL HYDROCHLORIDE 5; 20 MG/1; MG/1
5-20 CAPSULE ORAL
Qty: 90 | Refills: 4 | Status: DISCONTINUED | COMMUNITY
Start: 2019-11-06 | End: 2020-07-24

## 2020-07-24 NOTE — HISTORY OF PRESENT ILLNESS
[FreeTextEntry1] : Patient is an 79-year-old female with longstanding hypertension mitral regurgitation and moderate anxiety.  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  During that therapy it was noted that her blood pressure was spiking up significantly unclear if it was a direct effect from the medications or to an inhibitory effect on other blood pressure medications.  She now has completed the course of INH and will be going off it but has still has labile runs of blood pressure at times up to 160 and 170 systolic with accompanying frontal head pain.  She denies any symptoms of cough anorexia or weight loss

## 2020-07-24 NOTE — PHYSICAL EXAM
[Eyelids - No Xanthelasma] : the eyelids demonstrated no xanthelasmas [FreeTextEntry1] : Mod anxiety and tsress [Normal Conjunctiva] : the conjunctiva exhibited no abnormalities [Normal Oral Mucosa] : normal oral mucosa [No Oral Cyanosis] : no oral cyanosis [Normal Jugular Venous A Waves Present] : normal jugular venous A waves present [No Oral Pallor] : no oral pallor [No Jugular Venous Gould A Waves] : no jugular venous gould A waves [Normal Jugular Venous V Waves Present] : normal jugular venous V waves present [Exaggerated Use Of Accessory Muscles For Inspiration] : no accessory muscle use [Respiration, Rhythm And Depth] : normal respiratory rhythm and effort [Auscultation Breath Sounds / Voice Sounds] : lungs were clear to auscultation bilaterally [Normal S1] : normal S1 [Normal S2] : normal S2 [III] : a grade 3 [Left Carotid Bruit] : left carotid bruit heard [Right Carotid Bruit] : right carotid bruit heard [Abdomen Soft] : soft [Abdomen Tenderness] : non-tender [Abdomen Mass (___ Cm)] : no abdominal mass palpated [Abnormal Walk] : normal gait [Nail Clubbing] : no clubbing of the fingernails [Gait - Sufficient For Exercise Testing] : the gait was sufficient for exercise testing [Cyanosis, Localized] : no localized cyanosis [] : no ischemic changes [Petechial Hemorrhages (___cm)] : no petechial hemorrhages

## 2020-07-24 NOTE — ASSESSMENT
[FreeTextEntry1] : Assessment\par  \par Echocardiogram reveals LV function of 55% moderate mitral regurgitation was noted.  Mild aortic insufficiency was noted with thickened aortic valve.  Blood pressure remains somewhat light labile possibly related to the use of INH and rifampin she will be stopping those drugs effective immediately we will continue her on Coreg 3 pills in the morning 2 pills in the afternoon and starting on Monday begin titrating it down with close monitoring of blood pressure at home I explained to the patient that on a global level this is a concerning issue but does not represent a lethal threat to her health and will be on top of her blood pressure on a daily basis.  X-rays of her sinuses were also ordered

## 2020-07-25 ENCOUNTER — NON-APPOINTMENT (OUTPATIENT)
Age: 80
End: 2020-07-25

## 2020-07-27 LAB
ALBUMIN SERPL ELPH-MCNC: 4.6 G/DL
ALP BLD-CCNC: 61 U/L
ALT SERPL-CCNC: 13 U/L
ANION GAP SERPL CALC-SCNC: 11 MMOL/L
AST SERPL-CCNC: 21 U/L
BASOPHILS # BLD AUTO: 0.01 K/UL
BASOPHILS NFR BLD AUTO: 0.4 %
BILIRUB SERPL-MCNC: 0.4 MG/DL
BUN SERPL-MCNC: 16 MG/DL
CALCIUM SERPL-MCNC: 9.5 MG/DL
CHLORIDE SERPL-SCNC: 99 MMOL/L
CHOLEST SERPL-MCNC: 277 MG/DL
CHOLEST/HDLC SERPL: 3.1 RATIO
CO2 SERPL-SCNC: 26 MMOL/L
CREAT SERPL-MCNC: 0.74 MG/DL
EOSINOPHIL # BLD AUTO: 0.04 K/UL
EOSINOPHIL NFR BLD AUTO: 1.4 %
ERYTHROCYTE [SEDIMENTATION RATE] IN BLOOD BY WESTERGREN METHOD: 19 MM/HR
ESTIMATED AVERAGE GLUCOSE: 151 MG/DL
FOLATE SERPL-MCNC: 14.8 NG/ML
GLUCOSE SERPL-MCNC: 261 MG/DL
HBA1C MFR BLD HPLC: 6.9 %
HCT VFR BLD CALC: 42.6 %
HDLC SERPL-MCNC: 90 MG/DL
HGB BLD-MCNC: 13.5 G/DL
IMM GRANULOCYTES NFR BLD AUTO: 0 %
LDLC SERPL CALC-MCNC: 173 MG/DL
LYMPHOCYTES # BLD AUTO: 0.85 K/UL
LYMPHOCYTES NFR BLD AUTO: 30.8 %
MAN DIFF?: NORMAL
MCHC RBC-ENTMCNC: 29.3 PG
MCHC RBC-ENTMCNC: 31.7 GM/DL
MCV RBC AUTO: 92.6 FL
MONOCYTES # BLD AUTO: 0.27 K/UL
MONOCYTES NFR BLD AUTO: 9.8 %
NEUTROPHILS # BLD AUTO: 1.59 K/UL
NEUTROPHILS NFR BLD AUTO: 57.6 %
PLATELET # BLD AUTO: 155 K/UL
POTASSIUM SERPL-SCNC: 4.3 MMOL/L
PROT SERPL-MCNC: 6.4 G/DL
RBC # BLD: 4.6 M/UL
RBC # FLD: 13.9 %
SODIUM SERPL-SCNC: 137 MMOL/L
T3 SERPL-MCNC: 113 NG/DL
T3FREE SERPL-MCNC: 2.82 PG/ML
T4 FREE SERPL-MCNC: 0.8 NG/DL
T4 SERPL-MCNC: 5.6 UG/DL
TRIGL SERPL-MCNC: 71 MG/DL
TSH SERPL-ACNC: 2.39 UIU/ML
VIT B12 SERPL-MCNC: 618 PG/ML
WBC # FLD AUTO: 2.76 K/UL

## 2020-08-03 RX ORDER — CARVEDILOL 6.25 MG/1
6.25 TABLET, FILM COATED ORAL
Qty: 180 | Refills: 3 | Status: DISCONTINUED | COMMUNITY
Start: 2019-12-09 | End: 2020-08-03

## 2020-08-03 RX ORDER — RIFAMPIN 300 MG/1
300 CAPSULE ORAL
Refills: 0 | Status: DISCONTINUED | COMMUNITY
End: 2020-08-03

## 2020-08-03 RX ORDER — AMLODIPINE BESYLATE 10 MG/1
10 TABLET ORAL
Qty: 90 | Refills: 1 | Status: DISCONTINUED | COMMUNITY
Start: 2020-06-09 | End: 2020-08-03

## 2020-08-03 RX ORDER — ISONIAZID 300 MG/1
300 TABLET ORAL
Refills: 0 | Status: DISCONTINUED | COMMUNITY
End: 2020-08-03

## 2020-09-10 ENCOUNTER — RX RENEWAL (OUTPATIENT)
Age: 80
End: 2020-09-10

## 2020-10-01 ENCOUNTER — NON-APPOINTMENT (OUTPATIENT)
Age: 80
End: 2020-10-01

## 2020-10-01 ENCOUNTER — APPOINTMENT (OUTPATIENT)
Dept: HEART AND VASCULAR | Facility: CLINIC | Age: 80
End: 2020-10-01
Payer: MEDICARE

## 2020-10-01 VITALS
WEIGHT: 116 LBS | HEART RATE: 60 BPM | DIASTOLIC BLOOD PRESSURE: 90 MMHG | SYSTOLIC BLOOD PRESSURE: 180 MMHG | HEIGHT: 63 IN | BODY MASS INDEX: 20.55 KG/M2

## 2020-10-01 DIAGNOSIS — M79.2 NEURALGIA AND NEURITIS, UNSPECIFIED: ICD-10-CM

## 2020-10-01 PROCEDURE — 99214 OFFICE O/P EST MOD 30 MIN: CPT

## 2020-10-01 PROCEDURE — 93000 ELECTROCARDIOGRAM COMPLETE: CPT

## 2020-10-01 RX ORDER — AZITHROMYCIN 250 MG/1
250 TABLET, FILM COATED ORAL
Qty: 1 | Refills: 0 | Status: DISCONTINUED | COMMUNITY
Start: 2019-10-10 | End: 2020-10-01

## 2020-10-01 RX ORDER — CIPROFLOXACIN HYDROCHLORIDE 500 MG/1
500 TABLET, FILM COATED ORAL TWICE DAILY
Qty: 6 | Refills: 0 | Status: DISCONTINUED | COMMUNITY
Start: 2020-08-17 | End: 2020-10-01

## 2020-10-01 NOTE — PHYSICAL EXAM
[General Appearance - Well Developed] : well developed [Normal Appearance] : normal appearance [Well Groomed] : well groomed [General Appearance - Well Nourished] : well nourished [No Deformities] : no deformities [General Appearance - In No Acute Distress] : no acute distress [Normal Conjunctiva] : the conjunctiva exhibited no abnormalities [Eyelids - No Xanthelasma] : the eyelids demonstrated no xanthelasmas [Normal Oral Mucosa] : normal oral mucosa [No Oral Pallor] : no oral pallor [No Oral Cyanosis] : no oral cyanosis [Normal Jugular Venous A Waves Present] : normal jugular venous A waves present [Normal Jugular Venous V Waves Present] : normal jugular venous V waves present [No Jugular Venous Gould A Waves] : no jugular venous gould A waves [Respiration, Rhythm And Depth] : normal respiratory rhythm and effort [Exaggerated Use Of Accessory Muscles For Inspiration] : no accessory muscle use [Auscultation Breath Sounds / Voice Sounds] : lungs were clear to auscultation bilaterally [Heart Rate And Rhythm] : heart rate and rhythm were normal [Heart Sounds] : normal S1 and S2 [Murmurs] : no murmurs present [Abdomen Soft] : soft [Abdomen Tenderness] : non-tender [] : no hepato-splenomegaly [Abdomen Mass (___ Cm)] : no abdominal mass palpated [Abnormal Walk] : normal gait [Gait - Sufficient For Exercise Testing] : the gait was sufficient for exercise testing

## 2020-10-01 NOTE — HISTORY OF PRESENT ILLNESS
[FreeTextEntry1] : Bp spike assoc with neualgia pain on head \par BP was as high as 180 sys \par currently taking coreg 6.25 TID \par No nausea or vomiting \par

## 2020-10-01 NOTE — ASSESSMENT
[FreeTextEntry1] : Assessment \par will ask her to even out dosing of coreg to 12.5 bid \par trial of elavil 10 mg as Neurontin did not work in past

## 2020-10-14 ENCOUNTER — RX RENEWAL (OUTPATIENT)
Age: 80
End: 2020-10-14

## 2020-11-18 ENCOUNTER — NON-APPOINTMENT (OUTPATIENT)
Age: 80
End: 2020-11-18

## 2020-11-18 ENCOUNTER — APPOINTMENT (OUTPATIENT)
Dept: HEART AND VASCULAR | Facility: CLINIC | Age: 80
End: 2020-11-18
Payer: MEDICARE

## 2020-11-18 VITALS
BODY MASS INDEX: 20.38 KG/M2 | DIASTOLIC BLOOD PRESSURE: 85 MMHG | RESPIRATION RATE: 12 BRPM | HEART RATE: 57 BPM | WEIGHT: 115 LBS | HEIGHT: 63 IN | SYSTOLIC BLOOD PRESSURE: 160 MMHG

## 2020-11-18 VITALS — SYSTOLIC BLOOD PRESSURE: 150 MMHG | DIASTOLIC BLOOD PRESSURE: 88 MMHG

## 2020-11-18 DIAGNOSIS — J32.9 CHRONIC SINUSITIS, UNSPECIFIED: ICD-10-CM

## 2020-11-18 PROCEDURE — 93000 ELECTROCARDIOGRAM COMPLETE: CPT

## 2020-11-18 PROCEDURE — 99214 OFFICE O/P EST MOD 30 MIN: CPT

## 2020-11-18 NOTE — PHYSICAL EXAM
[General Appearance - Well Developed] : well developed [Normal Appearance] : normal appearance [Well Groomed] : well groomed [General Appearance - Well Nourished] : well nourished [No Deformities] : no deformities [General Appearance - In No Acute Distress] : no acute distress [Normal Conjunctiva] : the conjunctiva exhibited no abnormalities [Eyelids - No Xanthelasma] : the eyelids demonstrated no xanthelasmas [Normal Oral Mucosa] : normal oral mucosa [No Oral Pallor] : no oral pallor [No Oral Cyanosis] : no oral cyanosis [Normal Jugular Venous A Waves Present] : normal jugular venous A waves present [Normal Jugular Venous V Waves Present] : normal jugular venous V waves present [No Jugular Venous Gould A Waves] : no jugular venous gould A waves [Respiration, Rhythm And Depth] : normal respiratory rhythm and effort [Exaggerated Use Of Accessory Muscles For Inspiration] : no accessory muscle use [Auscultation Breath Sounds / Voice Sounds] : lungs were clear to auscultation bilaterally [Abdomen Soft] : soft [Abdomen Tenderness] : non-tender [] : no hepato-splenomegaly [Abdomen Mass (___ Cm)] : no abdominal mass palpated [Abnormal Walk] : normal gait [Gait - Sufficient For Exercise Testing] : the gait was sufficient for exercise testing

## 2020-11-18 NOTE — HISTORY OF PRESENT ILLNESS
[FreeTextEntry1] : on meds for BP \par has AM headaches in frontal area mod nasal congestion\par no response with zyrtec

## 2020-12-15 ENCOUNTER — RX RENEWAL (OUTPATIENT)
Age: 80
End: 2020-12-15

## 2020-12-16 PROBLEM — Z87.09 HISTORY OF UPPER RESPIRATORY INFECTION: Status: RESOLVED | Noted: 2018-09-26 | Resolved: 2020-12-16

## 2020-12-16 PROBLEM — Z87.09 HISTORY OF SORE THROAT: Status: RESOLVED | Noted: 2018-09-26 | Resolved: 2020-12-16

## 2021-01-11 ENCOUNTER — RX RENEWAL (OUTPATIENT)
Age: 81
End: 2021-01-11

## 2021-02-10 ENCOUNTER — RX RENEWAL (OUTPATIENT)
Age: 81
End: 2021-02-10

## 2021-02-17 ENCOUNTER — APPOINTMENT (OUTPATIENT)
Dept: HEART AND VASCULAR | Facility: CLINIC | Age: 81
End: 2021-02-17
Payer: MEDICARE

## 2021-02-17 ENCOUNTER — NON-APPOINTMENT (OUTPATIENT)
Age: 81
End: 2021-02-17

## 2021-02-17 VITALS
BODY MASS INDEX: 20.2 KG/M2 | DIASTOLIC BLOOD PRESSURE: 90 MMHG | HEART RATE: 60 BPM | SYSTOLIC BLOOD PRESSURE: 158 MMHG | HEIGHT: 63 IN | RESPIRATION RATE: 12 BRPM | WEIGHT: 114 LBS

## 2021-02-17 PROCEDURE — 99213 OFFICE O/P EST LOW 20 MIN: CPT

## 2021-02-17 PROCEDURE — 93000 ELECTROCARDIOGRAM COMPLETE: CPT

## 2021-02-17 NOTE — REVIEW OF SYSTEMS
[Headache] : headache [Feeling Fatigued] : feeling fatigued [Sinus Pressure] : sinus pressure [Negative] : Respiratory [Sore Throat] : no sore throat

## 2021-02-17 NOTE — ASSESSMENT
[FreeTextEntry1] : Assessment \par Chronic HA \par MRA neg MRI neg \par will give tramadol for back \par recent hgaic 6.8 \par on diet only at present

## 2021-02-17 NOTE — HISTORY OF PRESENT ILLNESS
[FreeTextEntry1] : Has had neuro eval for headaches \par also has right sided rib pain positional in nature

## 2021-03-04 ENCOUNTER — RX RENEWAL (OUTPATIENT)
Age: 81
End: 2021-03-04

## 2021-03-18 ENCOUNTER — APPOINTMENT (OUTPATIENT)
Dept: HEART AND VASCULAR | Facility: CLINIC | Age: 81
End: 2021-03-18
Payer: MEDICARE

## 2021-03-18 VITALS
HEART RATE: 75 BPM | SYSTOLIC BLOOD PRESSURE: 208 MMHG | BODY MASS INDEX: 20.2 KG/M2 | DIASTOLIC BLOOD PRESSURE: 112 MMHG | WEIGHT: 114 LBS | HEIGHT: 63 IN

## 2021-03-18 DIAGNOSIS — R51.9 HEADACHE, UNSPECIFIED: ICD-10-CM

## 2021-03-18 PROCEDURE — 99213 OFFICE O/P EST LOW 20 MIN: CPT

## 2021-03-18 NOTE — PHYSICAL EXAM
[General Appearance - Well Developed] : well developed [Normal Appearance] : normal appearance [Well Groomed] : well groomed [General Appearance - Well Nourished] : well nourished [No Deformities] : no deformities [General Appearance - In No Acute Distress] : no acute distress [Normal Oral Mucosa] : normal oral mucosa [No Oral Pallor] : no oral pallor [No Oral Cyanosis] : no oral cyanosis [Normal Jugular Venous A Waves Present] : normal jugular venous A waves present [Normal Jugular Venous V Waves Present] : normal jugular venous V waves present [No Jugular Venous Gould A Waves] : no jugular venous gould A waves [] : no respiratory distress [Respiration, Rhythm And Depth] : normal respiratory rhythm and effort [Exaggerated Use Of Accessory Muscles For Inspiration] : no accessory muscle use [Auscultation Breath Sounds / Voice Sounds] : lungs were clear to auscultation bilaterally [Heart Rate And Rhythm] : heart rate and rhythm were normal [Heart Sounds] : normal S1 and S2 [Murmurs] : no murmurs present

## 2021-03-18 NOTE — HISTORY OF PRESENT ILLNESS
[FreeTextEntry1] : Has been noticing elevated BP in the past 3 days\par 179/80\par Has HA for 3 days\par Pt denies CP, burning sensation, palpitations, blurry vision, nausea, vomiting, fatigue, diaphoresis, syncopal or near syncopal episodes. \par \par

## 2021-03-18 NOTE — DISCUSSION/SUMMARY
[FreeTextEntry1] : HTN: restart Amlodipine 10mg PO QD\par Pt will maintain a log with BP reading \par FU next week. \par Left sided Low back.hip pain: OTC agents for pain control\par HA: Tylenol; BP control. \par Pt is encouraged to call or come back to the office if symptoms worsen or do not improve in 1-2 days - pt verbalized good understanding. \par

## 2021-03-23 ENCOUNTER — APPOINTMENT (OUTPATIENT)
Dept: HEART AND VASCULAR | Facility: CLINIC | Age: 81
End: 2021-03-23

## 2021-03-30 ENCOUNTER — LABORATORY RESULT (OUTPATIENT)
Age: 81
End: 2021-03-30

## 2021-04-07 ENCOUNTER — NON-APPOINTMENT (OUTPATIENT)
Age: 81
End: 2021-04-07

## 2021-04-07 ENCOUNTER — APPOINTMENT (OUTPATIENT)
Dept: HEART AND VASCULAR | Facility: CLINIC | Age: 81
End: 2021-04-07
Payer: MEDICARE

## 2021-04-07 VITALS
HEIGHT: 63 IN | BODY MASS INDEX: 20.2 KG/M2 | RESPIRATION RATE: 12 BRPM | DIASTOLIC BLOOD PRESSURE: 80 MMHG | WEIGHT: 114 LBS | SYSTOLIC BLOOD PRESSURE: 150 MMHG | HEART RATE: 71 BPM

## 2021-04-07 VITALS — DIASTOLIC BLOOD PRESSURE: 80 MMHG | SYSTOLIC BLOOD PRESSURE: 142 MMHG

## 2021-04-07 PROCEDURE — 99214 OFFICE O/P EST MOD 30 MIN: CPT

## 2021-04-07 PROCEDURE — 93000 ELECTROCARDIOGRAM COMPLETE: CPT

## 2021-04-07 NOTE — ASSESSMENT
[FreeTextEntry1] : Assessment \par BP controlled on lower dose of coreg\par was given sttain for LDL rpt in 6/21\par echo ordered \par will give tramadol for back \par recent hgaic 6.9 will rx with diet for now\par on diet only at present

## 2021-04-07 NOTE — HISTORY OF PRESENT ILLNESS
[FreeTextEntry1] : bp stable on lower dose of coreg taken  6.25 qday at 5pm and norvasc 10 mg qd \par zocor added for high LDL no myalgias

## 2021-04-08 ENCOUNTER — RX RENEWAL (OUTPATIENT)
Age: 81
End: 2021-04-08

## 2021-05-03 ENCOUNTER — APPOINTMENT (OUTPATIENT)
Dept: HEART AND VASCULAR | Facility: CLINIC | Age: 81
End: 2021-05-03
Payer: MEDICARE

## 2021-05-03 PROCEDURE — 93306 TTE W/DOPPLER COMPLETE: CPT

## 2021-06-01 ENCOUNTER — RX RENEWAL (OUTPATIENT)
Age: 81
End: 2021-06-01

## 2021-06-03 ENCOUNTER — APPOINTMENT (OUTPATIENT)
Dept: HEART AND VASCULAR | Facility: CLINIC | Age: 81
End: 2021-06-03
Payer: MEDICARE

## 2021-06-03 VITALS
BODY MASS INDEX: 20.55 KG/M2 | WEIGHT: 116 LBS | SYSTOLIC BLOOD PRESSURE: 135 MMHG | HEIGHT: 63 IN | DIASTOLIC BLOOD PRESSURE: 80 MMHG | RESPIRATION RATE: 12 BRPM

## 2021-06-03 PROCEDURE — 36415 COLL VENOUS BLD VENIPUNCTURE: CPT

## 2021-06-03 PROCEDURE — 99214 OFFICE O/P EST MOD 30 MIN: CPT

## 2021-06-03 NOTE — HISTORY OF PRESENT ILLNESS
[FreeTextEntry1] : Bp stable on current meds\par  Had 1st vaccine in 2/21 followed by covid illlnes \par did not get second shotv  and is fearful of second vaccine \par otherwise feels well \par Will see uro gyn form pelvic pain

## 2021-06-03 NOTE — ASSESSMENT
[FreeTextEntry1] : Assessment \par BP stable \par will check hagic and LDL \par titre for vaccine and post illness IGG \par

## 2021-06-03 NOTE — REVIEW OF SYSTEMS
[Headache] : no headache [Feeling Fatigued] : not feeling fatigued [SOB] : no shortness of breath [Leg Claudication] : no intermittent leg claudication

## 2021-06-04 LAB
CHOLEST SERPL-MCNC: 207 MG/DL
COVID-19 NUCLEOCAPSID  GAM ANTIBODY INTERPRETATION: POSITIVE
COVID-19 SPIKE DOMAIN ANTIBODY INTERPRETATION: POSITIVE
ESTIMATED AVERAGE GLUCOSE: 143 MG/DL
HBA1C MFR BLD HPLC: 6.6 %
HDLC SERPL-MCNC: 87 MG/DL
LDLC SERPL CALC-MCNC: 105 MG/DL
NONHDLC SERPL-MCNC: 120 MG/DL
SARS-COV-2 AB SERPL IA-ACNC: >250 U/ML
SARS-COV-2 AB SERPL QL IA: 12.4 INDEX
TRIGL SERPL-MCNC: 75 MG/DL

## 2021-06-21 ENCOUNTER — RX RENEWAL (OUTPATIENT)
Age: 81
End: 2021-06-21

## 2021-08-04 ENCOUNTER — RX RENEWAL (OUTPATIENT)
Age: 81
End: 2021-08-04

## 2021-09-08 ENCOUNTER — NON-APPOINTMENT (OUTPATIENT)
Age: 81
End: 2021-09-08

## 2021-10-01 ENCOUNTER — APPOINTMENT (OUTPATIENT)
Dept: HEART AND VASCULAR | Facility: CLINIC | Age: 81
End: 2021-10-01

## 2021-10-28 ENCOUNTER — APPOINTMENT (OUTPATIENT)
Dept: HEART AND VASCULAR | Facility: CLINIC | Age: 81
End: 2021-10-28
Payer: MEDICARE

## 2021-10-28 ENCOUNTER — NON-APPOINTMENT (OUTPATIENT)
Age: 81
End: 2021-10-28

## 2021-10-28 VITALS
DIASTOLIC BLOOD PRESSURE: 72 MMHG | SYSTOLIC BLOOD PRESSURE: 148 MMHG | RESPIRATION RATE: 12 BRPM | WEIGHT: 120 LBS | HEART RATE: 67 BPM | HEIGHT: 63 IN | BODY MASS INDEX: 21.26 KG/M2

## 2021-10-28 PROCEDURE — 99214 OFFICE O/P EST MOD 30 MIN: CPT

## 2021-10-28 PROCEDURE — 36415 COLL VENOUS BLD VENIPUNCTURE: CPT

## 2021-10-28 NOTE — ASSESSMENT
[FreeTextEntry1] : Assessment \par BP stable \par leg pain may be due to norvasc \par Considering difficulty of BP control will maintain current meds \par

## 2021-10-28 NOTE — HISTORY OF PRESENT ILLNESS
[FreeTextEntry1] : Bp stable seen by GYN  for bladder prolapse declined surgical repair\par has neuropathic pain in feet mild swellng

## 2021-11-02 ENCOUNTER — RX RENEWAL (OUTPATIENT)
Age: 81
End: 2021-11-02

## 2021-12-02 ENCOUNTER — RX RENEWAL (OUTPATIENT)
Age: 81
End: 2021-12-02

## 2021-12-22 ENCOUNTER — NON-APPOINTMENT (OUTPATIENT)
Age: 81
End: 2021-12-22

## 2021-12-22 ENCOUNTER — APPOINTMENT (OUTPATIENT)
Dept: HEART AND VASCULAR | Facility: CLINIC | Age: 81
End: 2021-12-22
Payer: MEDICARE

## 2021-12-22 VITALS — BODY MASS INDEX: 20.2 KG/M2 | HEIGHT: 63 IN | HEART RATE: 75 BPM | WEIGHT: 114 LBS

## 2021-12-22 VITALS — SYSTOLIC BLOOD PRESSURE: 130 MMHG | DIASTOLIC BLOOD PRESSURE: 82 MMHG | HEART RATE: 64 BPM

## 2021-12-22 PROCEDURE — 93000 ELECTROCARDIOGRAM COMPLETE: CPT

## 2021-12-22 PROCEDURE — 99213 OFFICE O/P EST LOW 20 MIN: CPT | Mod: 25

## 2021-12-22 NOTE — DISCUSSION/SUMMARY
[FreeTextEntry1] : Will see endocrinologist to have blood work. \par EKG is WNL\par OTC pain medication for discomfort. FU with ortho as recommended. \par Pt is encouraged to call or come back to the office if symptoms worsen or do not improve in 1-2 days - pt verbalized good understanding.

## 2021-12-22 NOTE — HISTORY OF PRESENT ILLNESS
[FreeTextEntry1] : Seen for chest discomfort. \par Developed pain in the shoulder radiating to left arm and front of the chest. Seen an orthopedist, diagnosed with bursitis \par Pt denies chest burning sensation, palpitations, blurry vision, nausea, vomiting, fatigue, diaphoresis, syncopal or near syncopal episodes. \par Pain is reproducible

## 2022-01-03 ENCOUNTER — RX RENEWAL (OUTPATIENT)
Age: 82
End: 2022-01-03

## 2022-01-27 ENCOUNTER — APPOINTMENT (OUTPATIENT)
Dept: HEART AND VASCULAR | Facility: CLINIC | Age: 82
End: 2022-01-27
Payer: MEDICARE

## 2022-01-27 ENCOUNTER — NON-APPOINTMENT (OUTPATIENT)
Age: 82
End: 2022-01-27

## 2022-01-27 ENCOUNTER — RX RENEWAL (OUTPATIENT)
Age: 82
End: 2022-01-27

## 2022-01-27 VITALS
BODY MASS INDEX: 19.84 KG/M2 | SYSTOLIC BLOOD PRESSURE: 162 MMHG | RESPIRATION RATE: 12 BRPM | HEIGHT: 63 IN | HEART RATE: 65 BPM | WEIGHT: 112 LBS | DIASTOLIC BLOOD PRESSURE: 85 MMHG

## 2022-01-27 DIAGNOSIS — R07.89 OTHER CHEST PAIN: ICD-10-CM

## 2022-01-27 PROCEDURE — 93000 ELECTROCARDIOGRAM COMPLETE: CPT

## 2022-01-27 PROCEDURE — 99214 OFFICE O/P EST MOD 30 MIN: CPT

## 2022-01-27 PROCEDURE — 36415 COLL VENOUS BLD VENIPUNCTURE: CPT

## 2022-01-27 NOTE — HISTORY OF PRESENT ILLNESS
[FreeTextEntry1] : Patient is a 81-year-old white female with chronic levels of anxiety hypertension and diabetes who recently had blood work with a hemoglobin A1c of 6.8 which is being controlled with diet alone as she has declined the use of any oral medications.  Patient is also on a host of blood pressure medications.  Recent cholesterol revealed a HDL in the 90 range and an LDL of 124.  Patient more recently was presenting with symptoms of jaw bitemporal pain associated with upper back neck and arm pain which appears to be acutely worse based on the chronic condition.  She denies fevers she denies vision changes.\par Had been off zocor and restarted 2 weeks ago nad coincided with myalgias

## 2022-01-27 NOTE — REVIEW OF SYSTEMS
[Headache] : headache [Feeling Fatigued] : feeling fatigued [Joint Stiffness] : joint stiffness [Blurry Vision] : no blurred vision [Seeing Double (Diplopia)] : no diplopia [SOB] : no shortness of breath [Dyspnea on exertion] : not dyspnea during exertion [Joint Swelling] : no joint swelling [Muscle Cramps] : no muscle cramps

## 2022-01-27 NOTE — ASSESSMENT
[FreeTextEntry1] : Pain pattern suggestive of PMR vs statin related myalgias \par  will get ESR and CRP consider trial of prednisone \par has labile HTN and AODM will hold prednisone until bloods are back \par Results of bloods from 12/21 reviewed at length \par she has low level LDL but robust HDL level

## 2022-01-28 LAB
CRP SERPL-MCNC: <3 MG/L
ERYTHROCYTE [SEDIMENTATION RATE] IN BLOOD BY WESTERGREN METHOD: 6 MM/HR

## 2022-04-15 ENCOUNTER — RX RENEWAL (OUTPATIENT)
Age: 82
End: 2022-04-15

## 2022-05-03 ENCOUNTER — LABORATORY RESULT (OUTPATIENT)
Age: 82
End: 2022-05-03

## 2022-05-03 ENCOUNTER — APPOINTMENT (OUTPATIENT)
Dept: HEART AND VASCULAR | Facility: CLINIC | Age: 82
End: 2022-05-03
Payer: MEDICARE

## 2022-05-03 ENCOUNTER — NON-APPOINTMENT (OUTPATIENT)
Age: 82
End: 2022-05-03

## 2022-05-03 VITALS
SYSTOLIC BLOOD PRESSURE: 124 MMHG | RESPIRATION RATE: 14 BRPM | WEIGHT: 113 LBS | HEIGHT: 63 IN | BODY MASS INDEX: 20.02 KG/M2 | HEART RATE: 62 BPM | DIASTOLIC BLOOD PRESSURE: 84 MMHG

## 2022-05-03 PROCEDURE — 93000 ELECTROCARDIOGRAM COMPLETE: CPT

## 2022-05-03 PROCEDURE — 99214 OFFICE O/P EST MOD 30 MIN: CPT

## 2022-05-03 PROCEDURE — 36415 COLL VENOUS BLD VENIPUNCTURE: CPT

## 2022-05-03 NOTE — REVIEW OF SYSTEMS
[Joint Stiffness] : joint stiffness [Headache] : no headache [Feeling Fatigued] : not feeling fatigued [Blurry Vision] : no blurred vision [Seeing Double (Diplopia)] : no diplopia [SOB] : no shortness of breath [Dyspnea on exertion] : not dyspnea during exertion [Abdominal Pain] : abdominal pain [Heartburn] : heartburn [Change in Appetite] : change in appetite [Diarrhea] : diarrhea [Joint Swelling] : no joint swelling [Muscle Cramps] : no muscle cramps

## 2022-05-03 NOTE — DISCUSSION/SUMMARY
[Hypertension] : hypertension [Stable] : stable [Continue] : continuing calcium channel blockers [Dietary Modification] : dietary modification [Home Monitor] : a home monitor [ETOH Moderation] : alcohol moderation [Low Sodium Diet] : low sodium diet [NSAIDs Avoidance] : non-steroidal anti-inflammatory drugs avoidance [Patient] : the patient

## 2022-05-04 LAB
25(OH)D3 SERPL-MCNC: 40.2 NG/ML
ALBUMIN SERPL ELPH-MCNC: 4.8 G/DL
ALP BLD-CCNC: 75 U/L
ALT SERPL-CCNC: 9 U/L
ANION GAP SERPL CALC-SCNC: 12 MMOL/L
AST SERPL-CCNC: 16 U/L
BASOPHILS # BLD AUTO: 0.02 K/UL
BASOPHILS NFR BLD AUTO: 0.4 %
BILIRUB SERPL-MCNC: 0.5 MG/DL
BUN SERPL-MCNC: 16 MG/DL
CALCIUM SERPL-MCNC: 10.4 MG/DL
CHLORIDE SERPL-SCNC: 102 MMOL/L
CHOLEST SERPL-MCNC: 298 MG/DL
CO2 SERPL-SCNC: 28 MMOL/L
CREAT SERPL-MCNC: 0.79 MG/DL
EGFR: 75 ML/MIN/1.73M2
EOSINOPHIL # BLD AUTO: 0.07 K/UL
EOSINOPHIL NFR BLD AUTO: 1.5 %
ESTIMATED AVERAGE GLUCOSE: 143 MG/DL
FOLATE SERPL-MCNC: 15.1 NG/ML
GLUCOSE SERPL-MCNC: 113 MG/DL
HBA1C MFR BLD HPLC: 6.6 %
HCT VFR BLD CALC: 43.3 %
HDLC SERPL-MCNC: 87 MG/DL
HGB BLD-MCNC: 13.5 G/DL
IMM GRANULOCYTES NFR BLD AUTO: 0.2 %
LDLC SERPL CALC-MCNC: 189 MG/DL
LYMPHOCYTES # BLD AUTO: 1.71 K/UL
LYMPHOCYTES NFR BLD AUTO: 37.3 %
MAN DIFF?: NORMAL
MCHC RBC-ENTMCNC: 30.8 PG
MCHC RBC-ENTMCNC: 31.2 GM/DL
MCV RBC AUTO: 98.6 FL
MONOCYTES # BLD AUTO: 0.38 K/UL
MONOCYTES NFR BLD AUTO: 8.3 %
NEUTROPHILS # BLD AUTO: 2.4 K/UL
NEUTROPHILS NFR BLD AUTO: 52.3 %
NONHDLC SERPL-MCNC: 211 MG/DL
PLATELET # BLD AUTO: 162 K/UL
POTASSIUM SERPL-SCNC: 4.3 MMOL/L
PROT SERPL-MCNC: 7.1 G/DL
RBC # BLD: 4.39 M/UL
RBC # FLD: 12.9 %
SODIUM SERPL-SCNC: 142 MMOL/L
T3 SERPL-MCNC: 91 NG/DL
T3FREE SERPL-MCNC: 2.64 PG/ML
T3RU NFR SERPL: 1.1 TBI
T4 FREE SERPL-MCNC: 1.1 NG/DL
T4 SERPL-MCNC: 7 UG/DL
TRIGL SERPL-MCNC: 111 MG/DL
TSH SERPL-ACNC: 2.05 UIU/ML
VIT B12 SERPL-MCNC: 511 PG/ML
WBC # FLD AUTO: 4.59 K/UL

## 2022-05-10 ENCOUNTER — RX RENEWAL (OUTPATIENT)
Age: 82
End: 2022-05-10

## 2022-06-07 ENCOUNTER — RX RENEWAL (OUTPATIENT)
Age: 82
End: 2022-06-07

## 2022-06-29 ENCOUNTER — NON-APPOINTMENT (OUTPATIENT)
Age: 82
End: 2022-06-29

## 2022-06-29 ENCOUNTER — APPOINTMENT (OUTPATIENT)
Dept: HEART AND VASCULAR | Facility: CLINIC | Age: 82
End: 2022-06-29

## 2022-06-29 VITALS
WEIGHT: 110 LBS | RESPIRATION RATE: 14 BRPM | HEIGHT: 63 IN | BODY MASS INDEX: 19.49 KG/M2 | HEART RATE: 65 BPM | SYSTOLIC BLOOD PRESSURE: 140 MMHG | DIASTOLIC BLOOD PRESSURE: 80 MMHG

## 2022-06-29 DIAGNOSIS — K58.9 IRRITABLE BOWEL SYNDROME W/OUT DIARRHEA: ICD-10-CM

## 2022-06-29 PROCEDURE — 99214 OFFICE O/P EST MOD 30 MIN: CPT

## 2022-06-29 PROCEDURE — 93000 ELECTROCARDIOGRAM COMPLETE: CPT

## 2022-06-29 PROCEDURE — 36415 COLL VENOUS BLD VENIPUNCTURE: CPT

## 2022-06-29 NOTE — REVIEW OF SYSTEMS
[Abdominal Pain] : abdominal pain [Heartburn] : heartburn [Change in Appetite] : change in appetite [Joint Stiffness] : joint stiffness [Headache] : no headache [Feeling Fatigued] : not feeling fatigued [Blurry Vision] : no blurred vision [Seeing Double (Diplopia)] : no diplopia [SOB] : no shortness of breath [Dyspnea on exertion] : not dyspnea during exertion [Diarrhea] : diarrhea [Joint Swelling] : no joint swelling [Muscle Cramps] : no muscle cramps

## 2022-06-29 NOTE — HISTORY OF PRESENT ILLNESS
[FreeTextEntry1] : prior h/o gerd \par now has post prandial bloating and pain \par Had high  and resumed Zocor tiw \par no myalgia \par ongoing abd pain and spasm was given Hycomine \par recent hgaioc 6.6 off meds \par

## 2022-06-29 NOTE — DISCUSSION/SUMMARY
[Hypertension] : hypertension [Stable] : stable [Continue] : continuing calcium channel blockers [Dietary Modification] : dietary modification [Home Monitor] : a home monitor [ETOH Moderation] : alcohol moderation [Low Sodium Diet] : low sodium diet [NSAIDs Avoidance] : non-steroidal anti-inflammatory drugs avoidance [Patient] : the patient [EKG obtained to assist in diagnosis and management of assessed problem(s)] : EKG obtained to assist in diagnosis and management of assessed problem(s)

## 2022-06-29 NOTE — ASSESSMENT
[FreeTextEntry1] : add dexilant \par GI refer Dr Reynolds.n \par check LDL on statin tiw zocor 20 mg

## 2022-06-30 LAB
ALBUMIN SERPL ELPH-MCNC: 4.5 G/DL
ALP BLD-CCNC: 66 U/L
ALT SERPL-CCNC: 10 U/L
AST SERPL-CCNC: 19 U/L
BILIRUB DIRECT SERPL-MCNC: 0.2 MG/DL
BILIRUB INDIRECT SERPL-MCNC: 0.5 MG/DL
BILIRUB SERPL-MCNC: 0.6 MG/DL
CHOLEST SERPL-MCNC: 234 MG/DL
ESTIMATED AVERAGE GLUCOSE: 151 MG/DL
HBA1C MFR BLD HPLC: 6.9 %
HDLC SERPL-MCNC: 92 MG/DL
LDLC SERPL CALC-MCNC: 119 MG/DL
NONHDLC SERPL-MCNC: 142 MG/DL
PROT SERPL-MCNC: 6.7 G/DL
TRIGL SERPL-MCNC: 117 MG/DL

## 2022-07-05 ENCOUNTER — NON-APPOINTMENT (OUTPATIENT)
Age: 82
End: 2022-07-05

## 2022-07-07 ENCOUNTER — RX RENEWAL (OUTPATIENT)
Age: 82
End: 2022-07-07

## 2022-07-20 ENCOUNTER — RX RENEWAL (OUTPATIENT)
Age: 82
End: 2022-07-20

## 2022-08-01 ENCOUNTER — RX RENEWAL (OUTPATIENT)
Age: 82
End: 2022-08-01

## 2022-09-06 ENCOUNTER — RX RENEWAL (OUTPATIENT)
Age: 82
End: 2022-09-06

## 2022-09-13 ENCOUNTER — APPOINTMENT (OUTPATIENT)
Dept: HEART AND VASCULAR | Facility: CLINIC | Age: 82
End: 2022-09-13

## 2022-09-13 ENCOUNTER — NON-APPOINTMENT (OUTPATIENT)
Age: 82
End: 2022-09-13

## 2022-09-13 VITALS
RESPIRATION RATE: 15 BRPM | HEART RATE: 73 BPM | DIASTOLIC BLOOD PRESSURE: 83 MMHG | HEIGHT: 63 IN | WEIGHT: 113 LBS | BODY MASS INDEX: 20.02 KG/M2 | SYSTOLIC BLOOD PRESSURE: 140 MMHG

## 2022-09-13 DIAGNOSIS — R09.89 OTHER SPECIFIED SYMPTOMS AND SIGNS INVOLVING THE CIRCULATORY AND RESPIRATORY SYSTEMS: ICD-10-CM

## 2022-09-13 PROCEDURE — 93000 ELECTROCARDIOGRAM COMPLETE: CPT

## 2022-09-13 PROCEDURE — ZZZZZ: CPT

## 2022-09-13 PROCEDURE — 99214 OFFICE O/P EST MOD 30 MIN: CPT

## 2022-09-13 PROCEDURE — 93306 TTE W/DOPPLER COMPLETE: CPT

## 2022-09-13 PROCEDURE — 93880 EXTRACRANIAL BILAT STUDY: CPT

## 2022-09-13 RX ORDER — BLOOD-GLUCOSE METER
70 EACH MISCELLANEOUS
Qty: 200 | Refills: 5 | Status: ACTIVE | COMMUNITY
Start: 2019-09-04 | End: 1900-01-01

## 2022-09-13 NOTE — HISTORY OF PRESENT ILLNESS
[FreeTextEntry1] : prior h/o gerd \par now has post prandial bloating and pain \par Had high  and resumed Zocor tiw \par no myalgia \par ongoing abd pain and spasm was given Hycomine as well creon for pancreatic insuffeciency \par

## 2022-09-13 NOTE — DISCUSSION/SUMMARY
[Hypertension] : hypertension [Stable] : stable [Continue] : continuing calcium channel blockers [Dietary Modification] : dietary modification [Home Monitor] : a home monitor [ETOH Moderation] : alcohol moderation [Low Sodium Diet] : low sodium diet [NSAIDs Avoidance] : non-steroidal anti-inflammatory drugs avoidance [Patient] : the patient [EKG obtained to assist in diagnosis and management of assessed problem(s)] : EKG obtained to assist in diagnosis and management of assessed problem(s) [Moderate Mitral Regurgitation] : moderate mitral regurgitation [Compensated] : compensated [None] : There are no changes in medication management

## 2022-09-13 NOTE — ASSESSMENT
[FreeTextEntry1] : add dexilant \par GI refer Dr Reyonlds.n \par check LDL on statin tiw zocor 20 mg \par Ldl improved on statin \par recent hgaic 6.9 \par Echo Lvef 55% 2+ MR 1+AI Maya Dyfx \par Carotid diffuse smooth plaque

## 2022-10-06 ENCOUNTER — RX RENEWAL (OUTPATIENT)
Age: 82
End: 2022-10-06

## 2022-10-06 RX ORDER — URINE MULTIPLE TEST STRIPS
STRIP MISCELLANEOUS TWICE DAILY
Qty: 180 | Refills: 0 | Status: ACTIVE | COMMUNITY
Start: 2021-05-03 | End: 1900-01-01

## 2022-10-07 ENCOUNTER — RX RENEWAL (OUTPATIENT)
Age: 82
End: 2022-10-07

## 2022-10-25 ENCOUNTER — RX RENEWAL (OUTPATIENT)
Age: 82
End: 2022-10-25

## 2022-10-25 RX ORDER — BLOOD SUGAR DIAGNOSTIC
STRIP MISCELLANEOUS TWICE DAILY
Qty: 50 | Refills: 0 | Status: ACTIVE | COMMUNITY
Start: 2022-10-25 | End: 1900-01-01

## 2022-11-23 ENCOUNTER — RX RENEWAL (OUTPATIENT)
Age: 82
End: 2022-11-23

## 2022-11-30 ENCOUNTER — RX RENEWAL (OUTPATIENT)
Age: 82
End: 2022-11-30

## 2022-12-19 ENCOUNTER — APPOINTMENT (OUTPATIENT)
Dept: HEART AND VASCULAR | Facility: CLINIC | Age: 82
End: 2022-12-19

## 2022-12-19 ENCOUNTER — NON-APPOINTMENT (OUTPATIENT)
Age: 82
End: 2022-12-19

## 2022-12-19 VITALS
DIASTOLIC BLOOD PRESSURE: 80 MMHG | HEIGHT: 63 IN | WEIGHT: 116 LBS | BODY MASS INDEX: 20.55 KG/M2 | SYSTOLIC BLOOD PRESSURE: 150 MMHG | HEART RATE: 71 BPM | RESPIRATION RATE: 15 BRPM

## 2022-12-19 DIAGNOSIS — Z23 ENCOUNTER FOR IMMUNIZATION: ICD-10-CM

## 2022-12-19 DIAGNOSIS — B35.1 TINEA UNGUIUM: ICD-10-CM

## 2022-12-19 PROCEDURE — 90662 IIV NO PRSV INCREASED AG IM: CPT

## 2022-12-19 PROCEDURE — 99214 OFFICE O/P EST MOD 30 MIN: CPT

## 2022-12-19 PROCEDURE — G0008: CPT | Mod: 59

## 2022-12-19 PROCEDURE — 93000 ELECTROCARDIOGRAM COMPLETE: CPT

## 2022-12-19 NOTE — REVIEW OF SYSTEMS
[Joint Stiffness] : joint stiffness [Negative] : Heme/Lymph [Joint Swelling] : no joint swelling [Muscle Cramps] : no muscle cramps

## 2022-12-19 NOTE — DISCUSSION/SUMMARY
[EKG obtained to assist in diagnosis and management of assessed problem(s)] : EKG obtained to assist in diagnosis and management of assessed problem(s) [Hypertension] : hypertension [Stable] : stable [Continue] : continuing calcium channel blockers [FreeTextEntry1] : Hypertension: The impression is hypertension. Currently, the condition is stable. Medication management includes continuing beta blockers and continuing calcium channel blockers. Other planned treatment includes dietary modification, a home monitor, alcohol moderation, low sodium diet and non-steroidal anti-inflammatory drugs avoidance. The plan was discussed with the patient. \par Mitral Regurgitation: The impression is moderate mitral regurgitation. Currently, the condition is compensated. There are no changes in medication management. \par Primary cardiac risk factor modifications were described to the patient including regular exercise maintaining of a BMI of less then 30. Target LDL less than 100 \par Low fat diet 1800 mukesh intake Hgaic < 7\par \par

## 2022-12-19 NOTE — ADDENDUM
[FreeTextEntry1] : I, Andrea Medina, assisted in documentation on 12/19/2022 acting as a scribe for Dr. Yosi Geiger.\par \par

## 2022-12-19 NOTE — HISTORY OF PRESENT ILLNESS
[FreeTextEntry1] : 09/13/22\par prior h/o gerd \par now has post prandial bloating and pain \par Had high  and resumed Zocor tiw \par no myalgia \par ongoing abd pain and spasm was given Hycomine as well creon for pancreatic insuffeciency\par 12/19/22\par Denies Chest Pain, SOB, Dizziness, Leg edema, Orthopnea, PND, Palpitations, Syncope, STEVENS, Diaphoresis.\par Patient denies change in appetite, fatigue, heat or cold intolerance, myalgias, polydipsia, polyphagia, polyuria, tingling, numbness\par seen by ENDO recently hgaic 6.8 \par uses acarbose on prn basis

## 2022-12-19 NOTE — REASON FOR VISIT
[FreeTextEntry1] : PATRICE FEMI is being seen for hyperlipidemia. \par \par LIPIDS\par wgt loss \par AODM   \par MR \par LIPIDS

## 2022-12-27 ENCOUNTER — NON-APPOINTMENT (OUTPATIENT)
Age: 82
End: 2022-12-27

## 2023-01-09 ENCOUNTER — RX RENEWAL (OUTPATIENT)
Age: 83
End: 2023-01-09

## 2023-02-02 ENCOUNTER — RX RENEWAL (OUTPATIENT)
Age: 83
End: 2023-02-02

## 2023-02-23 ENCOUNTER — RX RENEWAL (OUTPATIENT)
Age: 83
End: 2023-02-23

## 2023-02-28 ENCOUNTER — RX RENEWAL (OUTPATIENT)
Age: 83
End: 2023-02-28

## 2023-03-13 ENCOUNTER — RX RENEWAL (OUTPATIENT)
Age: 83
End: 2023-03-13

## 2023-03-31 ENCOUNTER — APPOINTMENT (OUTPATIENT)
Dept: HEART AND VASCULAR | Facility: CLINIC | Age: 83
End: 2023-03-31

## 2023-04-04 ENCOUNTER — RX RENEWAL (OUTPATIENT)
Age: 83
End: 2023-04-04

## 2023-04-10 ENCOUNTER — NON-APPOINTMENT (OUTPATIENT)
Age: 83
End: 2023-04-10

## 2023-04-10 ENCOUNTER — APPOINTMENT (OUTPATIENT)
Dept: HEART AND VASCULAR | Facility: CLINIC | Age: 83
End: 2023-04-10
Payer: MEDICARE

## 2023-04-10 VITALS
WEIGHT: 116 LBS | SYSTOLIC BLOOD PRESSURE: 150 MMHG | DIASTOLIC BLOOD PRESSURE: 82 MMHG | HEIGHT: 63 IN | HEART RATE: 73 BPM | BODY MASS INDEX: 20.55 KG/M2 | RESPIRATION RATE: 15 BRPM

## 2023-04-10 DIAGNOSIS — G89.29 DORSALGIA, UNSPECIFIED: ICD-10-CM

## 2023-04-10 DIAGNOSIS — M54.9 DORSALGIA, UNSPECIFIED: ICD-10-CM

## 2023-04-10 DIAGNOSIS — R97.0 ELEVATED CARCINOEMBRYONIC ANTIGEN [CEA]: ICD-10-CM

## 2023-04-10 DIAGNOSIS — F41.9 ANXIETY DISORDER, UNSPECIFIED: ICD-10-CM

## 2023-04-10 PROCEDURE — 99214 OFFICE O/P EST MOD 30 MIN: CPT

## 2023-04-10 PROCEDURE — 93000 ELECTROCARDIOGRAM COMPLETE: CPT

## 2023-04-10 NOTE — REASON FOR VISIT
[FreeTextEntry1] : PATRICE FEMI is being seen for hyperlipidemia. \par \par LIPIDS\par wgt loss \par AODM \par MR \par LIPIDS \par

## 2023-04-10 NOTE — HISTORY OF PRESENT ILLNESS
[FreeTextEntry1] : 09/13/22\par prior h/o gerd \par now has post prandial bloating and pain \par Had high  and resumed Zocor tiw \par no myalgia \par ongoing abd pain and spasm was given Hycomine as well creon for pancreatic insuffeciency\par 12/19/22\par Denies Chest Pain, SOB, Dizziness, Leg edema, Orthopnea, PND, Palpitations, Syncope, STEVENS, Diaphoresis.\par Patient denies change in appetite, fatigue, heat or cold intolerance, myalgias, polydipsia, polyphagia, polyuria, tingling, numbness\par seen by ENDO recently hgaic 6.8 \par uses acarbose on prn basis \par 04/10/23\par hgaic 7.1 \par on creon for pancreatic insufficiency \par Had CEA drawn by ENDO was elevated \par CT abd 2/23 nl pancreas mod diverticular disease no infection noted \par was seen by  for UTI \par seen by rectal surgeon for rectal pain \par diffuse somatic pain in neck and muscles

## 2023-04-10 NOTE — DISCUSSION/SUMMARY
[EKG obtained to assist in diagnosis and management of assessed problem(s)] : EKG obtained to assist in diagnosis and management of assessed problem(s) [FreeTextEntry1] : Hypertension: The impression is hypertension. Currently, the condition is stable. Medication management includes continuing beta blockers and continuing calcium channel blockers. \par Hypertension: The impression is hypertension. Currently, the condition is stable. Medication management includes continuing beta blockers and continuing calcium channel blockers. Other planned treatment includes dietary modification, a home monitor, alcohol moderation, low sodium diet and non-steroidal anti-inflammatory drugs avoidance. The plan was discussed with the patient. \par Mitral Regurgitation: The impression is moderate mitral regurgitation. Currently, the condition is compensated. There are no changes in medication management. \par Primary cardiac risk factor modifications were described to the patient including regular exercise maintaining of a BMI of less then 30. Target LDL less than 100 \par Low fat diet 1800 mukesh intake Hgaic < 7\par \par . \par

## 2023-04-10 NOTE — PHYSICAL EXAM
[Well Developed] : well developed [Well Nourished] : well nourished [No Acute Distress] : no acute distress [Normal Conjunctiva] : normal conjunctiva [Normal Venous Pressure] : normal venous pressure [No Carotid Bruit] : no carotid bruit [Normal S1, S2] : normal S1, S2 [No Murmur] : no murmur [No Rub] : no rub [No Gallop] : no gallop [Clear Lung Fields] : clear lung fields [Good Air Entry] : good air entry [No Respiratory Distress] : no respiratory distress  [Soft] : abdomen soft [No Masses/organomegaly] : no masses/organomegaly [Non Tender] : non-tender [Normal Gait] : normal gait [Normal Bowel Sounds] : normal bowel sounds [No Edema] : no edema [No Cyanosis] : no cyanosis [No Clubbing] : no clubbing [No Varicosities] : no varicosities [No Rash] : no rash [No Skin Lesions] : no skin lesions [Moves all extremities] : moves all extremities [No Focal Deficits] : no focal deficits [Normal Speech] : normal speech [Alert and Oriented] : alert and oriented [Normal memory] : normal memory

## 2023-04-10 NOTE — ASSESSMENT
[FreeTextEntry1] : Essential hypertension (401.9) (I10)\par elevated CEA \par \par Bruit of left carotid artery (785.9) (R09.89)\par Essential hypertension (401.9) (I10)\par Non-rheumatic mitral regurgitation (424.0) (I34.0)\par AODM controlled on DIET ALONE \par Bp controlled on meds \par \par \par elev CEA GI refer Dr Reynolds.n \par pelvis sono fo elev CEA \par

## 2023-04-10 NOTE — ADDENDUM
[FreeTextEntry1] : I, Andrea Medina, assisted in documentation on 04/10/2023 acting as a scribe for Dr. Yosi Geiger.\par \par

## 2023-04-11 LAB
ERYTHROCYTE [SEDIMENTATION RATE] IN BLOOD BY WESTERGREN METHOD: 8 MM/HR
RHEUMATOID FACT SER QL: 12 IU/ML
URATE SERPL-MCNC: 4 MG/DL

## 2023-04-13 ENCOUNTER — NON-APPOINTMENT (OUTPATIENT)
Age: 83
End: 2023-04-13

## 2023-04-14 LAB — ANA SER IF-ACNC: NEGATIVE

## 2023-04-20 ENCOUNTER — NON-APPOINTMENT (OUTPATIENT)
Age: 83
End: 2023-04-20

## 2023-05-16 ENCOUNTER — RX RENEWAL (OUTPATIENT)
Age: 83
End: 2023-05-16

## 2023-05-18 ENCOUNTER — RX RENEWAL (OUTPATIENT)
Age: 83
End: 2023-05-18

## 2023-05-19 ENCOUNTER — NON-APPOINTMENT (OUTPATIENT)
Age: 83
End: 2023-05-19

## 2023-06-12 ENCOUNTER — RX RENEWAL (OUTPATIENT)
Age: 83
End: 2023-06-12

## 2023-06-13 ENCOUNTER — NON-APPOINTMENT (OUTPATIENT)
Age: 83
End: 2023-06-13

## 2023-06-26 ENCOUNTER — NON-APPOINTMENT (OUTPATIENT)
Age: 83
End: 2023-06-26

## 2023-06-26 ENCOUNTER — APPOINTMENT (OUTPATIENT)
Dept: HEART AND VASCULAR | Facility: CLINIC | Age: 83
End: 2023-06-26
Payer: MEDICARE

## 2023-06-26 ENCOUNTER — LABORATORY RESULT (OUTPATIENT)
Age: 83
End: 2023-06-26

## 2023-06-26 ENCOUNTER — RX RENEWAL (OUTPATIENT)
Age: 83
End: 2023-06-26

## 2023-06-26 VITALS
HEIGHT: 63 IN | HEART RATE: 82 BPM | WEIGHT: 115 LBS | SYSTOLIC BLOOD PRESSURE: 145 MMHG | RESPIRATION RATE: 14 BRPM | BODY MASS INDEX: 20.38 KG/M2 | DIASTOLIC BLOOD PRESSURE: 82 MMHG

## 2023-06-26 DIAGNOSIS — R59.0 LOCALIZED ENLARGED LYMPH NODES: ICD-10-CM

## 2023-06-26 DIAGNOSIS — M70.12: ICD-10-CM

## 2023-06-26 DIAGNOSIS — E78.00 PURE HYPERCHOLESTEROLEMIA, UNSPECIFIED: ICD-10-CM

## 2023-06-26 DIAGNOSIS — R25.2 CRAMP AND SPASM: ICD-10-CM

## 2023-06-26 DIAGNOSIS — E11.9 TYPE 2 DIABETES MELLITUS W/OUT COMPLICATIONS: ICD-10-CM

## 2023-06-26 DIAGNOSIS — R07.81 PLEURODYNIA: ICD-10-CM

## 2023-06-26 DIAGNOSIS — I34.0 NONRHEUMATIC MITRAL (VALVE) INSUFFICIENCY: ICD-10-CM

## 2023-06-26 DIAGNOSIS — M79.602 PAIN IN LEFT ARM: ICD-10-CM

## 2023-06-26 PROCEDURE — 36415 COLL VENOUS BLD VENIPUNCTURE: CPT

## 2023-06-26 PROCEDURE — 93000 ELECTROCARDIOGRAM COMPLETE: CPT

## 2023-06-26 PROCEDURE — 99214 OFFICE O/P EST MOD 30 MIN: CPT

## 2023-06-26 RX ORDER — BLOOD SUGAR DIAGNOSTIC
STRIP MISCELLANEOUS
Qty: 200 | Refills: 1 | Status: ACTIVE | COMMUNITY
Start: 2022-10-06 | End: 1900-01-01

## 2023-06-26 RX ORDER — LANCETS 30 GAUGE
EACH MISCELLANEOUS
Qty: 200 | Refills: 3 | Status: ACTIVE | COMMUNITY
Start: 2021-05-03 | End: 1900-01-01

## 2023-06-26 NOTE — DISCUSSION/SUMMARY
[Hypercholesterolemia] : hypercholesterolemia [Diet Modification] : diet modification [Essential Hypertension] : essential hypertension [Low Sodium Diet] : low sodium diet [NSAIDs Avoidance] : non-steroidal anti-inflammatory drugs avoidance [Mitral Regurgitation] : mitral regurgitation [Stable] : stable [None] : There are no changes in medication management [Sodium Restriction] : sodium restriction [Patient] : the patient [FreeTextEntry1] : Diabetes- Blood work drawn today. Maintain a low caloric, low sodium, low cholesterol diet. Dietary counseling given, diet and exercise discussed in detail.

## 2023-06-26 NOTE — END OF VISIT
[Time Spent: ___ minutes] : I have spent [unfilled] minutes of time on the encounter. [FreeTextEntry3] : All medical record entries made by the Scribe were at my, Dr. Omar Rodriguez, direction and personally dictated by me on 06/26/2023. I have reviewed the chart and agree that the record accurately reflects my personal performance of the history, physical exam, assessment and plan. I have also personally directed, reviewed, and agreed with the chart.

## 2023-06-26 NOTE — HISTORY OF PRESENT ILLNESS
[FreeTextEntry1] : Denies Chest Pain, SOB, Dizziness, Leg edema, Orthopnea, PND, Palpitations, Syncope, STEVENS, Diaphoresis.\par Patient denies change in appetite, fatigue, heat/cold intolerance, myalgias, polydipsia, polyphagia, polyuria, tingling, numbness.

## 2023-06-27 LAB
25(OH)D3 SERPL-MCNC: 46.8 NG/ML
ALBUMIN SERPL ELPH-MCNC: 4.5 G/DL
ALP BLD-CCNC: 63 U/L
ALT SERPL-CCNC: 13 U/L
ANION GAP SERPL CALC-SCNC: 16 MMOL/L
APPEARANCE: ABNORMAL
AST SERPL-CCNC: 21 U/L
BACTERIA: ABNORMAL /HPF
BILIRUB SERPL-MCNC: 0.4 MG/DL
BILIRUBIN URINE: NEGATIVE
BLOOD URINE: ABNORMAL
BUN SERPL-MCNC: 22 MG/DL
CALCIUM SERPL-MCNC: 9.8 MG/DL
CAST: 0 /LPF
CHLORIDE SERPL-SCNC: 99 MMOL/L
CHOLEST SERPL-MCNC: 256 MG/DL
CO2 SERPL-SCNC: 23 MMOL/L
COLOR: YELLOW
CREAT SERPL-MCNC: 0.9 MG/DL
EGFR: 63 ML/MIN/1.73M2
EPITHELIAL CELLS: 1 /HPF
ESTIMATED AVERAGE GLUCOSE: 151 MG/DL
FOLATE SERPL-MCNC: 13.6 NG/ML
GLUCOSE QUALITATIVE U: NEGATIVE MG/DL
GLUCOSE SERPL-MCNC: 112 MG/DL
HBA1C MFR BLD HPLC: 6.9 %
HDLC SERPL-MCNC: 82 MG/DL
KETONES URINE: NEGATIVE MG/DL
LDLC SERPL CALC-MCNC: 147 MG/DL
LEUKOCYTE ESTERASE URINE: ABNORMAL
MICROSCOPIC-UA: NORMAL
NITRITE URINE: POSITIVE
NONHDLC SERPL-MCNC: 175 MG/DL
PH URINE: 6
POTASSIUM SERPL-SCNC: 4.7 MMOL/L
PROT SERPL-MCNC: 7 G/DL
PROTEIN URINE: NEGATIVE MG/DL
RED BLOOD CELLS URINE: 0 /HPF
SODIUM SERPL-SCNC: 138 MMOL/L
SPECIFIC GRAVITY URINE: 1.01
T3 SERPL-MCNC: 96 NG/DL
T3FREE SERPL-MCNC: 2.32 PG/ML
T3RU NFR SERPL: 1.1 TBI
T4 FREE SERPL-MCNC: 1 NG/DL
T4 SERPL-MCNC: 7.3 UG/DL
TRIGL SERPL-MCNC: 141 MG/DL
TSH SERPL-ACNC: 1.51 UIU/ML
UROBILINOGEN URINE: 0.2 MG/DL
VIT B12 SERPL-MCNC: 457 PG/ML
WHITE BLOOD CELLS URINE: 228 /HPF

## 2023-07-05 ENCOUNTER — RX RENEWAL (OUTPATIENT)
Age: 83
End: 2023-07-05

## 2023-07-05 LAB — BACTERIA UR CULT: ABNORMAL

## 2023-07-11 ENCOUNTER — NON-APPOINTMENT (OUTPATIENT)
Age: 83
End: 2023-07-11

## 2023-10-10 ENCOUNTER — RX RENEWAL (OUTPATIENT)
Age: 83
End: 2023-10-10

## 2023-11-20 ENCOUNTER — RX RENEWAL (OUTPATIENT)
Age: 83
End: 2023-11-20

## 2023-11-27 ENCOUNTER — APPOINTMENT (OUTPATIENT)
Dept: HEART AND VASCULAR | Facility: CLINIC | Age: 83
End: 2023-11-27
Payer: MEDICARE

## 2023-11-27 ENCOUNTER — RX RENEWAL (OUTPATIENT)
Age: 83
End: 2023-11-27

## 2023-11-27 ENCOUNTER — NON-APPOINTMENT (OUTPATIENT)
Age: 83
End: 2023-11-27

## 2023-11-27 VITALS
DIASTOLIC BLOOD PRESSURE: 80 MMHG | HEIGHT: 63 IN | HEART RATE: 72 BPM | BODY MASS INDEX: 22.15 KG/M2 | RESPIRATION RATE: 14 BRPM | SYSTOLIC BLOOD PRESSURE: 150 MMHG | WEIGHT: 125 LBS

## 2023-11-27 DIAGNOSIS — I10 ESSENTIAL (PRIMARY) HYPERTENSION: ICD-10-CM

## 2023-11-27 PROCEDURE — 93880 EXTRACRANIAL BILAT STUDY: CPT

## 2023-11-27 PROCEDURE — 99214 OFFICE O/P EST MOD 30 MIN: CPT

## 2023-11-27 PROCEDURE — 93306 TTE W/DOPPLER COMPLETE: CPT

## 2023-11-27 RX ORDER — CICLOPIROX 8 %
8 KIT TOPICAL
Qty: 1 | Refills: 3 | Status: DISCONTINUED | COMMUNITY
Start: 2022-12-19 | End: 2023-11-27

## 2023-11-30 ENCOUNTER — APPOINTMENT (OUTPATIENT)
Dept: HEART AND VASCULAR | Facility: CLINIC | Age: 83
End: 2023-11-30

## 2023-12-07 ENCOUNTER — RX RENEWAL (OUTPATIENT)
Age: 83
End: 2023-12-07

## 2023-12-18 ENCOUNTER — RX RENEWAL (OUTPATIENT)
Age: 83
End: 2023-12-18

## 2024-01-16 ENCOUNTER — RX RENEWAL (OUTPATIENT)
Age: 84
End: 2024-01-16

## 2024-02-14 ENCOUNTER — RX RENEWAL (OUTPATIENT)
Age: 84
End: 2024-02-14

## 2024-03-12 ENCOUNTER — RX RENEWAL (OUTPATIENT)
Age: 84
End: 2024-03-12

## 2024-03-28 ENCOUNTER — RX RENEWAL (OUTPATIENT)
Age: 84
End: 2024-03-28

## 2024-03-28 RX ORDER — SIMVASTATIN 20 MG/1
20 TABLET, FILM COATED ORAL
Qty: 90 | Refills: 3 | Status: ACTIVE | COMMUNITY
Start: 2018-06-28 | End: 1900-01-01

## 2024-04-03 ENCOUNTER — RX RENEWAL (OUTPATIENT)
Age: 84
End: 2024-04-03

## 2024-04-09 ENCOUNTER — APPOINTMENT (OUTPATIENT)
Dept: HEART AND VASCULAR | Facility: CLINIC | Age: 84
End: 2024-04-09
Payer: MEDICARE

## 2024-04-09 ENCOUNTER — LABORATORY RESULT (OUTPATIENT)
Age: 84
End: 2024-04-09

## 2024-04-09 ENCOUNTER — NON-APPOINTMENT (OUTPATIENT)
Age: 84
End: 2024-04-09

## 2024-04-09 VITALS
HEIGHT: 63 IN | SYSTOLIC BLOOD PRESSURE: 166 MMHG | WEIGHT: 125 LBS | DIASTOLIC BLOOD PRESSURE: 86 MMHG | BODY MASS INDEX: 22.15 KG/M2

## 2024-04-09 DIAGNOSIS — K64.2 THIRD DEGREE HEMORRHOIDS: ICD-10-CM

## 2024-04-09 PROCEDURE — 93000 ELECTROCARDIOGRAM COMPLETE: CPT

## 2024-04-09 PROCEDURE — 99214 OFFICE O/P EST MOD 30 MIN: CPT

## 2024-04-09 PROCEDURE — G2211 COMPLEX E/M VISIT ADD ON: CPT

## 2024-04-09 NOTE — HISTORY OF PRESENT ILLNESS
[FreeTextEntry1] : 09/13/22 prior h/o gerd  now has post prandial bloating and pain  Had high  and resumed Zocor tiw  no myalgia  ongoing abd pain and spasm was given Hycomine as well creon for pancreatic insuffeciency  12/19/22 Denies Chest Pain, SOB, Dizziness, Leg edema, Orthopnea, PND, Palpitations, Syncope, STEVENS, Diaphoresis. Patient denies change in appetite, fatigue, heat or cold intolerance, myalgias, polydipsia, polyphagia, polyuria, tingling, numbness seen by ENDO recently hgaic 6.8  uses acarbose on prn basis   04/10/23 hgaic 7.1  on creon for pancreatic insufficiency  Had CEA drawn by ENDO was elevated  CT abd 2/23 nl pancreas mod diverticular disease no infection noted  was seen by  for UTI  seen by rectal surgeon for rectal pain  diffuse somatic pain in neck and muscles   11/27/23 feels weak no angina or stevens  no head ache  recent uterine / bladder prolapse repair periodic dizziness seen by ENT   MRI was taken was told to stop lorazepam   4/9/24 S/P BALDDER AND VAGINAL PROLAPSE REPAIR  IN OCT 23  has rectal bleeding and pain when sitting has prior h/o hemorrhoids has pain w defection  also feels dysuria  relief w prep H  was given vaginal estrogen

## 2024-04-09 NOTE — DISCUSSION/SUMMARY
[FreeTextEntry1] : Hypertension: The impression is hypertension. Currently, the condition is stable. Medication management includes continuing beta blockers and continuing calcium channel blockers. \par  Hypertension: The impression is hypertension. Currently, the condition is stable. Medication management includes continuing beta blockers and continuing calcium channel blockers. Other planned treatment includes dietary modification, a home monitor, alcohol moderation, low sodium diet and non-steroidal anti-inflammatory drugs avoidance. The plan was discussed with the patient. \par  Mitral Regurgitation: The impression is moderate mitral regurgitation. Currently, the condition is compensated. There are no changes in medication management. \par  Primary cardiac risk factor modifications were described to the patient including regular exercise maintaining of a BMI of less then 30. Target LDL less than 100 \par  Low fat diet 1800 mukesh intake Hgaic < 7\par  \par  . \par

## 2024-04-09 NOTE — PHYSICAL EXAM
[Well Developed] : well developed [Well Nourished] : well nourished [No Acute Distress] : no acute distress [Normal Conjunctiva] : normal conjunctiva [Normal Venous Pressure] : normal venous pressure [No Carotid Bruit] : no carotid bruit [Normal S1, S2] : normal S1, S2 [No Murmur] : no murmur [No Rub] : no rub [No Gallop] : no gallop [Clear Lung Fields] : clear lung fields [Good Air Entry] : good air entry [No Respiratory Distress] : no respiratory distress  [Soft] : abdomen soft [Non Tender] : non-tender [No Masses/organomegaly] : no masses/organomegaly [Normal Bowel Sounds] : normal bowel sounds [Normal Gait] : normal gait [No Edema] : no edema [No Cyanosis] : no cyanosis [No Clubbing] : no clubbing [No Varicosities] : no varicosities [No Rash] : no rash [No Skin Lesions] : no skin lesions [Moves all extremities] : moves all extremities [No Focal Deficits] : no focal deficits [Normal Speech] : normal speech [Alert and Oriented] : alert and oriented [Normal memory] : normal memory [de-identified] : pos suprapubic pain large ext hemmrhoid at 11 oclock position no desirae in vault

## 2024-04-09 NOTE — REVIEW OF SYSTEMS
[SOB] : shortness of breath [Joint Stiffness] : joint stiffness [Negative] : Heme/Lymph [Joint Swelling] : no joint swelling [Muscle Cramps] : no muscle cramps [FreeTextEntry4] : vertigo

## 2024-04-10 DIAGNOSIS — N39.0 URINARY TRACT INFECTION, SITE NOT SPECIFIED: ICD-10-CM

## 2024-04-10 LAB
25(OH)D3 SERPL-MCNC: 53.7 NG/ML
ALBUMIN SERPL ELPH-MCNC: 4.7 G/DL
ALP BLD-CCNC: 90 U/L
ALT SERPL-CCNC: 12 U/L
ANION GAP SERPL CALC-SCNC: 16 MMOL/L
APPEARANCE: ABNORMAL
AST SERPL-CCNC: 19 U/L
BACTERIA: NEGATIVE /HPF
BASOPHILS # BLD AUTO: 0.03 K/UL
BASOPHILS NFR BLD AUTO: 0.5 %
BILIRUB SERPL-MCNC: 0.5 MG/DL
BILIRUBIN URINE: NEGATIVE
BLOOD URINE: ABNORMAL
BUN SERPL-MCNC: 13 MG/DL
CALCIUM SERPL-MCNC: 9.7 MG/DL
CAST: 0 /LPF
CHLORIDE SERPL-SCNC: 92 MMOL/L
CHOLEST SERPL-MCNC: 225 MG/DL
CO2 SERPL-SCNC: 22 MMOL/L
COLOR: YELLOW
CREAT SERPL-MCNC: 0.8 MG/DL
EGFR: 73 ML/MIN/1.73M2
EOSINOPHIL # BLD AUTO: 0.07 K/UL
EOSINOPHIL NFR BLD AUTO: 1.2 %
EPITHELIAL CELLS: 3 /HPF
ESTIMATED AVERAGE GLUCOSE: 163 MG/DL
FOLATE SERPL-MCNC: >20 NG/ML
GLUCOSE QUALITATIVE U: NEGATIVE MG/DL
GLUCOSE SERPL-MCNC: 118 MG/DL
HBA1C MFR BLD HPLC: 7.3 %
HCT VFR BLD CALC: 42.7 %
HDLC SERPL-MCNC: 89 MG/DL
HGB BLD-MCNC: 13.6 G/DL
IMM GRANULOCYTES NFR BLD AUTO: 0.3 %
KETONES URINE: NEGATIVE MG/DL
LDLC SERPL CALC-MCNC: 119 MG/DL
LEUKOCYTE ESTERASE URINE: ABNORMAL
LYMPHOCYTES # BLD AUTO: 1.72 K/UL
LYMPHOCYTES NFR BLD AUTO: 29.5 %
MAN DIFF?: NORMAL
MCHC RBC-ENTMCNC: 28.9 PG
MCHC RBC-ENTMCNC: 31.9 GM/DL
MCV RBC AUTO: 90.9 FL
MICROSCOPIC-UA: NORMAL
MONOCYTES # BLD AUTO: 0.46 K/UL
MONOCYTES NFR BLD AUTO: 7.9 %
NEUTROPHILS # BLD AUTO: 3.54 K/UL
NEUTROPHILS NFR BLD AUTO: 60.6 %
NITRITE URINE: NEGATIVE
NONHDLC SERPL-MCNC: 136 MG/DL
PH URINE: 6
PLATELET # BLD AUTO: 189 K/UL
POTASSIUM SERPL-SCNC: 4.3 MMOL/L
PROT SERPL-MCNC: 7.2 G/DL
PROTEIN URINE: NORMAL MG/DL
RBC # BLD: 4.7 M/UL
RBC # FLD: 14 %
RED BLOOD CELLS URINE: 2 /HPF
REVIEW: NORMAL
SODIUM SERPL-SCNC: 130 MMOL/L
SPECIFIC GRAVITY URINE: 1.01
T3FREE SERPL-MCNC: 2.53 PG/ML
T3RU NFR SERPL: 1.2 TBI
T4 FREE SERPL-MCNC: 1.1 NG/DL
T4 SERPL-MCNC: 8 UG/DL
TRIGL SERPL-MCNC: 99 MG/DL
TSH SERPL-ACNC: 2.24 UIU/ML
UROBILINOGEN URINE: 0.2 MG/DL
VIT B12 SERPL-MCNC: 506 PG/ML
WBC # FLD AUTO: 5.84 K/UL
WHITE BLOOD CELLS URINE: 790 /HPF

## 2024-04-12 LAB — BACTERIA UR CULT: ABNORMAL

## 2024-04-26 ENCOUNTER — APPOINTMENT (OUTPATIENT)
Dept: HEART AND VASCULAR | Facility: CLINIC | Age: 84
End: 2024-04-26
Payer: MEDICARE

## 2024-04-26 ENCOUNTER — NON-APPOINTMENT (OUTPATIENT)
Age: 84
End: 2024-04-26

## 2024-04-26 VITALS
BODY MASS INDEX: 21.26 KG/M2 | SYSTOLIC BLOOD PRESSURE: 152 MMHG | WEIGHT: 120 LBS | HEIGHT: 63 IN | DIASTOLIC BLOOD PRESSURE: 78 MMHG | HEART RATE: 71 BPM

## 2024-04-26 DIAGNOSIS — Z00.00 ENCOUNTER FOR GENERAL ADULT MEDICAL EXAMINATION W/OUT ABNORMAL FINDINGS: ICD-10-CM

## 2024-04-26 DIAGNOSIS — K21.9 GASTRO-ESOPHAGEAL REFLUX DISEASE W/OUT ESOPHAGITIS: ICD-10-CM

## 2024-04-26 PROCEDURE — 99214 OFFICE O/P EST MOD 30 MIN: CPT

## 2024-04-26 RX ORDER — DEXLANSOPRAZOLE 60 MG/1
60 CAPSULE, DELAYED RELEASE ORAL
Qty: 90 | Refills: 3 | Status: ACTIVE | COMMUNITY
Start: 2019-11-07

## 2024-04-26 RX ORDER — PANCRELIPASE 120000; 24000; 76000 [USP'U]/1; [USP'U]/1; [USP'U]/1
24000-76000 CAPSULE, DELAYED RELEASE PELLETS ORAL
Refills: 0 | Status: ACTIVE | COMMUNITY

## 2024-04-26 RX ORDER — CARVEDILOL 6.25 MG/1
6.25 TABLET, FILM COATED ORAL
Qty: 90 | Refills: 3 | Status: ACTIVE | COMMUNITY
Start: 2018-05-08

## 2024-04-26 RX ORDER — METFORMIN HYDROCHLORIDE 500 MG/1
500 TABLET, COATED ORAL
Refills: 0 | Status: ACTIVE | COMMUNITY

## 2024-04-26 NOTE — PHYSICAL EXAM
[Well Developed] : well developed [Frail] : frail [Normal] : no edema, no cyanosis, no clubbing, no varicosities [de-identified] : neg murphys sign  no rebound no gaurding diffusely tender non focal BS pos

## 2024-04-26 NOTE — HISTORY OF PRESENT ILLNESS
[FreeTextEntry1] : [birth year]83-year-old white female with longstanding history of irritable bowel syndrome chronic hemorrhoids dyspepsia GERD disease who has been seen by gastroenterology currently on PPIs.  Patient presents with 2 to 3 days of abdominal bloating frequent burping no vomiting no diarrhea.  She also has crampy abdominal pain.

## 2024-04-30 LAB
ALBUMIN SERPL ELPH-MCNC: 4.8 G/DL
ALP BLD-CCNC: 89 U/L
ALT SERPL-CCNC: 9 U/L
AMYLASE/CREAT SERPL: 69 U/L
AST SERPL-CCNC: 16 U/L
BILIRUB DIRECT SERPL-MCNC: 0.1 MG/DL
BILIRUB INDIRECT SERPL-MCNC: 0.4 MG/DL
BILIRUB SERPL-MCNC: 0.5 MG/DL
LPL SERPL-CCNC: 29 U/L
PROT SERPL-MCNC: 7.3 G/DL

## 2024-05-08 ENCOUNTER — RX RENEWAL (OUTPATIENT)
Age: 84
End: 2024-05-08

## 2024-05-20 ENCOUNTER — APPOINTMENT (OUTPATIENT)
Dept: HEART AND VASCULAR | Facility: CLINIC | Age: 84
End: 2024-05-20

## 2024-06-03 ENCOUNTER — RX RENEWAL (OUTPATIENT)
Age: 84
End: 2024-06-03

## 2024-06-04 ENCOUNTER — RX RENEWAL (OUTPATIENT)
Age: 84
End: 2024-06-04

## 2024-06-04 RX ORDER — AMLODIPINE BESYLATE 10 MG/1
10 TABLET ORAL
Qty: 90 | Refills: 1 | Status: ACTIVE | COMMUNITY
Start: 2021-05-19 | End: 1900-01-01

## 2024-06-24 ENCOUNTER — RX RENEWAL (OUTPATIENT)
Age: 84
End: 2024-06-24

## 2024-06-24 RX ORDER — LORAZEPAM 1 MG/1
1 TABLET ORAL
Qty: 90 | Refills: 0 | Status: ACTIVE | COMMUNITY
Start: 2018-06-21 | End: 1900-01-01

## 2024-07-17 ENCOUNTER — NON-APPOINTMENT (OUTPATIENT)
Age: 84
End: 2024-07-17

## 2024-07-17 ENCOUNTER — APPOINTMENT (OUTPATIENT)
Dept: HEART AND VASCULAR | Facility: CLINIC | Age: 84
End: 2024-07-17

## 2024-07-17 VITALS
HEART RATE: 66 BPM | HEIGHT: 63 IN | WEIGHT: 122 LBS | SYSTOLIC BLOOD PRESSURE: 155 MMHG | BODY MASS INDEX: 21.62 KG/M2 | RESPIRATION RATE: 14 BRPM | DIASTOLIC BLOOD PRESSURE: 72 MMHG

## 2024-07-17 DIAGNOSIS — Z00.00 ENCOUNTER FOR GENERAL ADULT MEDICAL EXAMINATION W/OUT ABNORMAL FINDINGS: ICD-10-CM

## 2024-07-17 DIAGNOSIS — N39.0 URINARY TRACT INFECTION, SITE NOT SPECIFIED: ICD-10-CM

## 2024-07-17 DIAGNOSIS — I10 ESSENTIAL (PRIMARY) HYPERTENSION: ICD-10-CM

## 2024-07-17 DIAGNOSIS — E78.00 PURE HYPERCHOLESTEROLEMIA, UNSPECIFIED: ICD-10-CM

## 2024-07-17 DIAGNOSIS — E11.9 TYPE 2 DIABETES MELLITUS W/OUT COMPLICATIONS: ICD-10-CM

## 2024-07-17 PROCEDURE — 93000 ELECTROCARDIOGRAM COMPLETE: CPT

## 2024-07-17 PROCEDURE — G2211 COMPLEX E/M VISIT ADD ON: CPT

## 2024-07-17 PROCEDURE — 99214 OFFICE O/P EST MOD 30 MIN: CPT

## 2024-07-17 RX ORDER — METHENAMINE HIPPURATE 1 G/1
1 TABLET ORAL
Qty: 120 | Refills: 3 | Status: ACTIVE | COMMUNITY
Start: 2024-07-17 | End: 1900-01-01

## 2024-07-18 ENCOUNTER — NON-APPOINTMENT (OUTPATIENT)
Age: 84
End: 2024-07-18

## 2024-07-18 LAB
25(OH)D3 SERPL-MCNC: 51.2 NG/ML
ALBUMIN SERPL ELPH-MCNC: 4.8 G/DL
ALP BLD-CCNC: 82 U/L
ALT SERPL-CCNC: 11 U/L
ANION GAP SERPL CALC-SCNC: 12 MMOL/L
APPEARANCE: CLEAR
AST SERPL-CCNC: 17 U/L
BACTERIA: NEGATIVE /HPF
BASOPHILS # BLD AUTO: 0.01 K/UL
BASOPHILS NFR BLD AUTO: 0.2 %
BILIRUB SERPL-MCNC: 0.5 MG/DL
BILIRUBIN URINE: NEGATIVE
BLOOD URINE: NEGATIVE
BUN SERPL-MCNC: 16 MG/DL
CALCIUM SERPL-MCNC: 9.7 MG/DL
CAST: 1 /LPF
CHLORIDE SERPL-SCNC: 98 MMOL/L
CHOLEST SERPL-MCNC: 268 MG/DL
CO2 SERPL-SCNC: 24 MMOL/L
COLOR: YELLOW
CREAT SERPL-MCNC: 0.81 MG/DL
EGFR: 72 ML/MIN/1.73M2
EOSINOPHIL # BLD AUTO: 0.09 K/UL
EOSINOPHIL NFR BLD AUTO: 2 %
EPITHELIAL CELLS: 2 /HPF
ESTIMATED AVERAGE GLUCOSE: 157 MG/DL
FOLATE SERPL-MCNC: 13.5 NG/ML
GLUCOSE QUALITATIVE U: NEGATIVE MG/DL
GLUCOSE SERPL-MCNC: 155 MG/DL
HBA1C MFR BLD HPLC: 7.1 %
HCT VFR BLD CALC: 43.9 %
HDLC SERPL-MCNC: 81 MG/DL
HGB BLD-MCNC: 13.5 G/DL
IMM GRANULOCYTES NFR BLD AUTO: 0.2 %
KETONES URINE: NEGATIVE MG/DL
LDLC SERPL CALC-MCNC: 162 MG/DL
LEUKOCYTE ESTERASE URINE: ABNORMAL
LYMPHOCYTES # BLD AUTO: 1.58 K/UL
LYMPHOCYTES NFR BLD AUTO: 35 %
MAN DIFF?: NORMAL
MCHC RBC-ENTMCNC: 30.1 PG
MCHC RBC-ENTMCNC: 30.8 GM/DL
MCV RBC AUTO: 98 FL
MICROSCOPIC-UA: NORMAL
MONOCYTES # BLD AUTO: 0.34 K/UL
MONOCYTES NFR BLD AUTO: 7.5 %
NEUTROPHILS # BLD AUTO: 2.48 K/UL
NEUTROPHILS NFR BLD AUTO: 55.1 %
NITRITE URINE: NEGATIVE
NONHDLC SERPL-MCNC: 188 MG/DL
PH URINE: 6.5
PLATELET # BLD AUTO: 187 K/UL
POTASSIUM SERPL-SCNC: 4.6 MMOL/L
PROT SERPL-MCNC: 7.3 G/DL
PROTEIN URINE: NEGATIVE MG/DL
RBC # BLD: 4.48 M/UL
RBC # FLD: 14.2 %
RED BLOOD CELLS URINE: 0 /HPF
REVIEW: NORMAL
SODIUM SERPL-SCNC: 134 MMOL/L
SPECIFIC GRAVITY URINE: 1.01
T3 SERPL-MCNC: 109 NG/DL
T3FREE SERPL-MCNC: 2.66 PG/ML
T4 FREE SERPL-MCNC: 1 NG/DL
T4 SERPL-MCNC: 6.9 UG/DL
TRIGL SERPL-MCNC: 145 MG/DL
TSH SERPL-ACNC: 1.96 UIU/ML
UROBILINOGEN URINE: 0.2 MG/DL
VIT B12 SERPL-MCNC: 443 PG/ML
WBC # FLD AUTO: 4.51 K/UL
WHITE BLOOD CELLS URINE: 1 /HPF

## 2024-07-19 LAB — BACTERIA UR CULT: NORMAL

## 2024-08-07 ENCOUNTER — RX RENEWAL (OUTPATIENT)
Age: 84
End: 2024-08-07

## 2024-09-03 ENCOUNTER — RX RENEWAL (OUTPATIENT)
Age: 84
End: 2024-09-03

## 2024-09-12 ENCOUNTER — APPOINTMENT (OUTPATIENT)
Dept: HEART AND VASCULAR | Facility: CLINIC | Age: 84
End: 2024-09-12
Payer: MEDICARE

## 2024-09-12 ENCOUNTER — NON-APPOINTMENT (OUTPATIENT)
Age: 84
End: 2024-09-12

## 2024-09-12 VITALS
WEIGHT: 120 LBS | DIASTOLIC BLOOD PRESSURE: 80 MMHG | HEIGHT: 63 IN | HEART RATE: 67 BPM | RESPIRATION RATE: 14 BRPM | BODY MASS INDEX: 21.26 KG/M2 | SYSTOLIC BLOOD PRESSURE: 152 MMHG

## 2024-09-12 DIAGNOSIS — E11.9 TYPE 2 DIABETES MELLITUS W/OUT COMPLICATIONS: ICD-10-CM

## 2024-09-12 PROCEDURE — 99214 OFFICE O/P EST MOD 30 MIN: CPT

## 2024-09-12 RX ORDER — PHENAZOPYRIDINE HYDROCHLORIDE 100 MG/1
100 TABLET ORAL 3 TIMES DAILY
Qty: 90 | Refills: 3 | Status: ACTIVE | COMMUNITY
Start: 2024-09-12 | End: 1900-01-01

## 2024-09-13 ENCOUNTER — NON-APPOINTMENT (OUTPATIENT)
Age: 84
End: 2024-09-13

## 2024-09-13 LAB
BASOPHILS # BLD AUTO: 0.03 K/UL
BASOPHILS NFR BLD AUTO: 0.5 %
EOSINOPHIL # BLD AUTO: 0.06 K/UL
EOSINOPHIL NFR BLD AUTO: 1.1 %
ESTIMATED AVERAGE GLUCOSE: 163 MG/DL
HBA1C MFR BLD HPLC: 7.3 %
HCT VFR BLD CALC: 42.6 %
HGB BLD-MCNC: 13.4 G/DL
IMM GRANULOCYTES NFR BLD AUTO: 0.2 %
LYMPHOCYTES # BLD AUTO: 2.16 K/UL
LYMPHOCYTES NFR BLD AUTO: 39.4 %
MAN DIFF?: NORMAL
MCHC RBC-ENTMCNC: 30 PG
MCHC RBC-ENTMCNC: 31.5 GM/DL
MCV RBC AUTO: 95.5 FL
MONOCYTES # BLD AUTO: 0.39 K/UL
MONOCYTES NFR BLD AUTO: 7.1 %
NEUTROPHILS # BLD AUTO: 2.83 K/UL
NEUTROPHILS NFR BLD AUTO: 51.7 %
PLATELET # BLD AUTO: 167 K/UL
RBC # BLD: 4.46 M/UL
RBC # FLD: 13.7 %
WBC # FLD AUTO: 5.48 K/UL

## 2024-09-16 LAB
APPEARANCE: CLEAR
BACTERIA: NEGATIVE /HPF
BILIRUBIN URINE: NEGATIVE
BLOOD URINE: NEGATIVE
CAST: 0 /LPF
COLOR: YELLOW
EPITHELIAL CELLS: 1 /HPF
GLUCOSE QUALITATIVE U: NEGATIVE MG/DL
KETONES URINE: NEGATIVE MG/DL
LEUKOCYTE ESTERASE URINE: ABNORMAL
MICROSCOPIC-UA: NORMAL
NITRITE URINE: NEGATIVE
PH URINE: 6.5
PROTEIN URINE: NEGATIVE MG/DL
RED BLOOD CELLS URINE: 0 /HPF
REVIEW: NORMAL
SPECIFIC GRAVITY URINE: 1
UROBILINOGEN URINE: 0.2 MG/DL
WHITE BLOOD CELLS URINE: 1 /HPF

## 2024-09-16 NOTE — PHYSICAL EXAM
[Well Developed] : well developed [Frail] : frail [Ill-Appearing] : ill-appearing [Normal] : no edema, no cyanosis, no clubbing, no varicosities [de-identified] : neg murphys sign  no rebound no gaurding diffusely tender non focal BS pos

## 2024-09-16 NOTE — REVIEW OF SYSTEMS
[Feeling Fatigued] : feeling fatigued [Dysuria] : dysuria [Pelvic Pain] : pelvic pain [Dizziness] : dizziness [Weight Gain (___ Lbs)] : no recent weight gain [Weight Loss (___ Lbs)] : no recent weight loss [SOB] : no shortness of breath [Abn Vaginal Bleeding] : no unexplained vaginal bleeding [Convulsions] : no convulsions [Limb Weakness (Paresis)] : no limb weakness (Paresis)

## 2024-09-16 NOTE — ADDENDUM
[FreeTextEntry1] : Telly Carroll assisted in documentation on 09/12/24 acting as a scribe for Dr. Yosi Geiger.

## 2024-09-16 NOTE — ADDENDUM
[FreeTextEntry1] : Telly Carroll assisted in documentation on 09/12/24 acting as a scribe for Dr. Yosi Geiger.  Declined

## 2024-09-16 NOTE — HISTORY OF PRESENT ILLNESS
[FreeTextEntry1] : [birth year]-year-old white female with longstanding history of irritable bowel syndrome chronic hemorrhoids dyspepsia GERD disease who has been seen by gastroenterology currently on PPIs.  Patient presents with 2 to 3 days of abdominal bloating frequent burping no vomiting no diarrhea.  She also has crampy abdominal pain.  07/17/24 recurrent UTI seen by  given ABX  was given Macrobid was concerned about allergy  took Macrobid has weakness and near syncope  was noted to have high BP and meds were stopped  was prescibed vaginal cream   09/12/24 ongoing dysuria and rectal hemorrhoids  still has rectal pain and has periodic dysuria  on meds for pancreatic insuff

## 2024-09-16 NOTE — PHYSICAL EXAM
[Well Developed] : well developed [Frail] : frail [Ill-Appearing] : ill-appearing [Normal] : no edema, no cyanosis, no clubbing, no varicosities [de-identified] : neg murphys sign  no rebound no gaurding diffusely tender non focal BS pos

## 2024-09-17 LAB — BACTERIA UR CULT: ABNORMAL

## 2024-09-26 ENCOUNTER — RX RENEWAL (OUTPATIENT)
Age: 84
End: 2024-09-26

## 2024-10-29 ENCOUNTER — APPOINTMENT (OUTPATIENT)
Dept: HEART AND VASCULAR | Facility: CLINIC | Age: 84
End: 2024-10-29
Payer: MEDICARE

## 2024-10-29 ENCOUNTER — RX RENEWAL (OUTPATIENT)
Age: 84
End: 2024-10-29

## 2024-10-29 VITALS
HEART RATE: 67 BPM | SYSTOLIC BLOOD PRESSURE: 130 MMHG | HEIGHT: 63 IN | WEIGHT: 116 LBS | DIASTOLIC BLOOD PRESSURE: 80 MMHG | BODY MASS INDEX: 20.55 KG/M2

## 2024-10-29 DIAGNOSIS — B35.1 TINEA UNGUIUM: ICD-10-CM

## 2024-10-29 DIAGNOSIS — H60.551 ACUTE REACTIVE OTITIS EXTERNA, RIGHT EAR: ICD-10-CM

## 2024-10-29 DIAGNOSIS — N39.0 URINARY TRACT INFECTION, SITE NOT SPECIFIED: ICD-10-CM

## 2024-10-29 DIAGNOSIS — H60.90 UNSPECIFIED OTITIS EXTERNA, UNSPECIFIED EAR: ICD-10-CM

## 2024-10-29 DIAGNOSIS — Z00.00 ENCOUNTER FOR GENERAL ADULT MEDICAL EXAMINATION W/OUT ABNORMAL FINDINGS: ICD-10-CM

## 2024-10-29 PROCEDURE — 99214 OFFICE O/P EST MOD 30 MIN: CPT

## 2024-10-29 RX ORDER — NEOMYCIN SULFATE, POLYMYXIN B SULFATE, HYDROCORTISONE 3.5; 10000; 1 MG/ML; [USP'U]/ML; MG/ML
1 SOLUTION/ DROPS AURICULAR (OTIC) 4 TIMES DAILY
Qty: 1 | Refills: 3 | Status: ACTIVE | COMMUNITY
Start: 2024-10-29 | End: 1900-01-01

## 2024-10-30 LAB
ANION GAP SERPL CALC-SCNC: 14 MMOL/L
APPEARANCE: CLEAR
BACTERIA: ABNORMAL /HPF
BASOPHILS # BLD AUTO: 0.03 K/UL
BASOPHILS NFR BLD AUTO: 0.6 %
BILIRUBIN URINE: NEGATIVE
BLOOD URINE: NEGATIVE
BUN SERPL-MCNC: 16 MG/DL
CALCIUM SERPL-MCNC: 10 MG/DL
CAST: 1 /LPF
CHLORIDE SERPL-SCNC: 100 MMOL/L
CO2 SERPL-SCNC: 24 MMOL/L
COLOR: YELLOW
CREAT SERPL-MCNC: 0.79 MG/DL
EGFR: 74 ML/MIN/1.73M2
EOSINOPHIL # BLD AUTO: 0.1 K/UL
EOSINOPHIL NFR BLD AUTO: 1.8 %
EPITHELIAL CELLS: 1 /HPF
ESTIMATED AVERAGE GLUCOSE: 157 MG/DL
GLUCOSE QUALITATIVE U: NEGATIVE MG/DL
GLUCOSE SERPL-MCNC: 151 MG/DL
HBA1C MFR BLD HPLC: 7.1 %
HCT VFR BLD CALC: 43.8 %
HGB BLD-MCNC: 13.6 G/DL
IMM GRANULOCYTES NFR BLD AUTO: 0.2 %
KETONES URINE: NEGATIVE MG/DL
LEUKOCYTE ESTERASE URINE: ABNORMAL
LYMPHOCYTES # BLD AUTO: 2.09 K/UL
LYMPHOCYTES NFR BLD AUTO: 38.5 %
MAN DIFF?: NORMAL
MCHC RBC-ENTMCNC: 30.7 PG
MCHC RBC-ENTMCNC: 31.1 G/DL
MCV RBC AUTO: 98.9 FL
MICROSCOPIC-UA: NORMAL
MONOCYTES # BLD AUTO: 0.36 K/UL
MONOCYTES NFR BLD AUTO: 6.6 %
NEUTROPHILS # BLD AUTO: 2.84 K/UL
NEUTROPHILS NFR BLD AUTO: 52.3 %
NITRITE URINE: NEGATIVE
PH URINE: 6.5
PLATELET # BLD AUTO: 187 K/UL
POTASSIUM SERPL-SCNC: 4.2 MMOL/L
PROTEIN URINE: NEGATIVE MG/DL
RBC # BLD: 4.43 M/UL
RBC # FLD: 13.4 %
RED BLOOD CELLS URINE: 0 /HPF
SODIUM SERPL-SCNC: 138 MMOL/L
SPECIFIC GRAVITY URINE: 1
UROBILINOGEN URINE: 0.2 MG/DL
WBC # FLD AUTO: 5.43 K/UL
WHITE BLOOD CELLS URINE: 6 /HPF

## 2024-10-31 ENCOUNTER — NON-APPOINTMENT (OUTPATIENT)
Age: 84
End: 2024-10-31

## 2024-11-02 ENCOUNTER — NON-APPOINTMENT (OUTPATIENT)
Age: 84
End: 2024-11-02

## 2024-11-04 ENCOUNTER — NON-APPOINTMENT (OUTPATIENT)
Age: 84
End: 2024-11-04

## 2024-11-04 LAB — BACTERIA UR CULT: ABNORMAL

## 2024-11-06 RX ORDER — FOSFOMYCIN TROMETHAMINE 3 G/1
3 POWDER ORAL
Qty: 3 | Refills: 0 | Status: ACTIVE | COMMUNITY
Start: 2024-11-06 | End: 1900-01-01

## 2024-11-25 ENCOUNTER — RX RENEWAL (OUTPATIENT)
Age: 84
End: 2024-11-25